# Patient Record
Sex: MALE | Race: WHITE | Employment: FULL TIME | URBAN - METROPOLITAN AREA
[De-identification: names, ages, dates, MRNs, and addresses within clinical notes are randomized per-mention and may not be internally consistent; named-entity substitution may affect disease eponyms.]

---

## 2017-08-23 ENCOUNTER — TELEPHONE (OUTPATIENT)
Dept: FAMILY MEDICINE CLINIC | Age: 47
End: 2017-08-23

## 2017-08-23 DIAGNOSIS — Z00.00 ANNUAL PHYSICAL EXAM: Primary | ICD-10-CM

## 2017-09-29 LAB
A/G RATIO: 1.7 (ref 1.1–2.2)
ALBUMIN SERPL-MCNC: 4.7 G/DL (ref 3.4–5)
ALP BLD-CCNC: 42 U/L (ref 40–129)
ALT SERPL-CCNC: 28 U/L (ref 10–40)
ANION GAP SERPL CALCULATED.3IONS-SCNC: 13 MMOL/L (ref 3–16)
AST SERPL-CCNC: 20 U/L (ref 15–37)
BASOPHILS ABSOLUTE: 0 K/UL (ref 0–0.2)
BASOPHILS RELATIVE PERCENT: 0.9 %
BILIRUB SERPL-MCNC: 1 MG/DL (ref 0–1)
BUN BLDV-MCNC: 19 MG/DL (ref 7–20)
CALCIUM SERPL-MCNC: 9.8 MG/DL (ref 8.3–10.6)
CHLORIDE BLD-SCNC: 100 MMOL/L (ref 99–110)
CHOLESTEROL, TOTAL: 159 MG/DL (ref 0–199)
CO2: 27 MMOL/L (ref 21–32)
CREAT SERPL-MCNC: 1 MG/DL (ref 0.9–1.3)
EOSINOPHILS ABSOLUTE: 0.2 K/UL (ref 0–0.6)
EOSINOPHILS RELATIVE PERCENT: 3.3 %
GFR AFRICAN AMERICAN: >60
GFR NON-AFRICAN AMERICAN: >60
GLOBULIN: 2.7 G/DL
GLUCOSE BLD-MCNC: 106 MG/DL (ref 70–99)
HCT VFR BLD CALC: 46.8 % (ref 40.5–52.5)
HDLC SERPL-MCNC: 50 MG/DL (ref 40–60)
HEMOGLOBIN: 15.7 G/DL (ref 13.5–17.5)
LDL CHOLESTEROL CALCULATED: 84 MG/DL
LYMPHOCYTES ABSOLUTE: 1.4 K/UL (ref 1–5.1)
LYMPHOCYTES RELATIVE PERCENT: 29.2 %
MCH RBC QN AUTO: 30.6 PG (ref 26–34)
MCHC RBC AUTO-ENTMCNC: 33.5 G/DL (ref 31–36)
MCV RBC AUTO: 91.4 FL (ref 80–100)
MONOCYTES ABSOLUTE: 0.4 K/UL (ref 0–1.3)
MONOCYTES RELATIVE PERCENT: 7.8 %
NEUTROPHILS ABSOLUTE: 2.9 K/UL (ref 1.7–7.7)
NEUTROPHILS RELATIVE PERCENT: 58.8 %
PDW BLD-RTO: 13.5 % (ref 12.4–15.4)
PLATELET # BLD: 195 K/UL (ref 135–450)
PMV BLD AUTO: 9 FL (ref 5–10.5)
POTASSIUM SERPL-SCNC: 4.8 MMOL/L (ref 3.5–5.1)
RBC # BLD: 5.12 M/UL (ref 4.2–5.9)
SODIUM BLD-SCNC: 140 MMOL/L (ref 136–145)
TOTAL PROTEIN: 7.4 G/DL (ref 6.4–8.2)
TRIGL SERPL-MCNC: 124 MG/DL (ref 0–150)
TSH REFLEX: 1.43 UIU/ML (ref 0.27–4.2)
VLDLC SERPL CALC-MCNC: 25 MG/DL
WBC # BLD: 5 K/UL (ref 4–11)

## 2017-10-05 ENCOUNTER — OFFICE VISIT (OUTPATIENT)
Dept: FAMILY MEDICINE CLINIC | Age: 47
End: 2017-10-05

## 2017-10-05 VITALS
DIASTOLIC BLOOD PRESSURE: 74 MMHG | OXYGEN SATURATION: 96 % | WEIGHT: 220 LBS | RESPIRATION RATE: 14 BRPM | BODY MASS INDEX: 31.5 KG/M2 | HEIGHT: 70 IN | HEART RATE: 61 BPM | SYSTOLIC BLOOD PRESSURE: 126 MMHG

## 2017-10-05 DIAGNOSIS — R73.9 HYPERGLYCEMIA: ICD-10-CM

## 2017-10-05 DIAGNOSIS — Z00.00 ANNUAL PHYSICAL EXAM: Primary | ICD-10-CM

## 2017-10-05 DIAGNOSIS — Z11.4 SCREENING FOR HIV (HUMAN IMMUNODEFICIENCY VIRUS): ICD-10-CM

## 2017-10-05 LAB
BILIRUBIN, POC: NORMAL
BLOOD URINE, POC: NORMAL
CLARITY, POC: CLEAR
COLOR, POC: YELLOW
GLUCOSE URINE, POC: NORMAL
KETONES, POC: NORMAL
LEUKOCYTE EST, POC: NORMAL
NITRITE, POC: NORMAL
PH, POC: 6
PROTEIN, POC: NORMAL
SPECIFIC GRAVITY, POC: 1.01
UROBILINOGEN, POC: 0.2

## 2017-10-05 PROCEDURE — 90630 INFLUENZA, QUADV, 18-64 YRS, ID, PF, MICRO INJ, 0.1ML (FLUZONE QUADV, PF): CPT | Performed by: FAMILY MEDICINE

## 2017-10-05 PROCEDURE — 81002 URINALYSIS NONAUTO W/O SCOPE: CPT | Performed by: FAMILY MEDICINE

## 2017-10-05 PROCEDURE — 90471 IMMUNIZATION ADMIN: CPT | Performed by: FAMILY MEDICINE

## 2017-10-05 PROCEDURE — 99396 PREV VISIT EST AGE 40-64: CPT | Performed by: FAMILY MEDICINE

## 2017-10-05 ASSESSMENT — PATIENT HEALTH QUESTIONNAIRE - PHQ9
SUM OF ALL RESPONSES TO PHQ QUESTIONS 1-9: 1
2. FEELING DOWN, DEPRESSED OR HOPELESS: 1
SUM OF ALL RESPONSES TO PHQ9 QUESTIONS 1 & 2: 1
1. LITTLE INTEREST OR PLEASURE IN DOING THINGS: 0

## 2017-10-05 NOTE — PROGRESS NOTES
Vaccine Information Sheet, \"Influenza - Inactivated\"  given to Maryuri Barbosa, or parent/legal guardian of  Maryuri Barbosa and verbalized understanding. Patient responses:    Have you ever had a reaction to a flu vaccine? No  Are you able to eat eggs without adverse effects? Yes  Do you have any current illness? No  Have you ever had Guillian [place] Syndrome? No    Flu vaccine given per order. Please see immunization tab.
scaling, itching, masses, hair or nail changes  Eyes: no change in vision  Ears/Nose/Throat: no hearing loss, tinnitus, vertigo, nosebleed, nasal congestion, rhinorrhea, sore throat  Respiratory: no cough or shortness of breath  Cardiovascular: no chest pain, SOLARES, orthopnea, PND, palpitations, or claudication  Gastrointestinal: no nausea, vomiting, abdominal pain or change in stools. No blood in stools. Genitourinary: no urinary symptoms or incontinence. No erectile dysfunction. Musculoskeletal: no arthritis, arthralgia, myalgia or weakness  Neurologic: no weakness/numbness, seizures, or headaches  Hematologic/Lymphatic/Immunologic: no abnormal bleeding/bruising, fever, chills, night sweats, swollen glands, or unexplained weight loss  Endocrine: no heat or cold intolerance and no polyphagia, polydipsia, or polyuria    OBJECTIVE:   /74 (Site: Left Arm, Position: Sitting, Cuff Size: Small Adult)  Pulse 61  Resp 14  Ht 5' 10\" (1.778 m)  Wt 220 lb (99.8 kg)  SpO2 96%  BMI 31.57 kg/m2  General appearance: healthy, alert, no distress, oriented x3  Skin: Skin color, texture, turgor normal. No rashes or suspicious lesions. No induration or tightening palpated. HEENT: normocephalic, atraumatic. PERRLA, EOMI. Conjunctiva/corneas clear. External ears normal. Canals clear. TM's clear bilaterally. Hearing was grossly normal.  Nares patent, septum midline, no drainage. Lips, mucosa and tongue normal.  Teeth and gums normal.  Oropharynx clear without exudate. Neck: supple. No adenopathy or thyromegaly. No carotid bruits were noted. Lungs: Lungs clear to auscultation bilaterally. No retractions or use of accessory muscles. No wheezes, rhonchi or rales. Heart: S1 and S2 normal, no murmurs, rubs or gallops, regular rate and rhythm. PMI nondisplaced. Abdomen: soft, non-tender, non-distended. Normoactive BS. No guarding, mass or organomegaly.     Extremities: No deformities, clubbing, cyanosis or edema,

## 2019-02-05 ENCOUNTER — OFFICE VISIT (OUTPATIENT)
Dept: FAMILY MEDICINE CLINIC | Age: 49
End: 2019-02-05
Payer: COMMERCIAL

## 2019-02-05 VITALS
SYSTOLIC BLOOD PRESSURE: 118 MMHG | HEART RATE: 68 BPM | BODY MASS INDEX: 31.04 KG/M2 | RESPIRATION RATE: 15 BRPM | DIASTOLIC BLOOD PRESSURE: 70 MMHG | HEIGHT: 70 IN | OXYGEN SATURATION: 98 % | WEIGHT: 216.8 LBS

## 2019-02-05 DIAGNOSIS — A08.4 VIRAL GASTROENTERITIS: Primary | ICD-10-CM

## 2019-02-05 PROCEDURE — 99213 OFFICE O/P EST LOW 20 MIN: CPT | Performed by: FAMILY MEDICINE

## 2019-02-05 ASSESSMENT — PATIENT HEALTH QUESTIONNAIRE - PHQ9
2. FEELING DOWN, DEPRESSED OR HOPELESS: 0
SUM OF ALL RESPONSES TO PHQ9 QUESTIONS 1 & 2: 0
1. LITTLE INTEREST OR PLEASURE IN DOING THINGS: 0
SUM OF ALL RESPONSES TO PHQ QUESTIONS 1-9: 0
SUM OF ALL RESPONSES TO PHQ QUESTIONS 1-9: 0

## 2019-02-18 ENCOUNTER — OFFICE VISIT (OUTPATIENT)
Dept: FAMILY MEDICINE CLINIC | Age: 49
End: 2019-02-18
Payer: COMMERCIAL

## 2019-02-18 VITALS
WEIGHT: 214.9 LBS | DIASTOLIC BLOOD PRESSURE: 60 MMHG | TEMPERATURE: 99.4 F | HEART RATE: 74 BPM | OXYGEN SATURATION: 98 % | BODY MASS INDEX: 30.83 KG/M2 | SYSTOLIC BLOOD PRESSURE: 90 MMHG

## 2019-02-18 DIAGNOSIS — J10.1 INFLUENZA A: Primary | ICD-10-CM

## 2019-02-18 LAB
INFLUENZA A ANTIBODY: NORMAL
INFLUENZA B ANTIBODY: NORMAL

## 2019-02-18 PROCEDURE — 87804 INFLUENZA ASSAY W/OPTIC: CPT | Performed by: FAMILY MEDICINE

## 2019-02-18 PROCEDURE — 99213 OFFICE O/P EST LOW 20 MIN: CPT | Performed by: FAMILY MEDICINE

## 2019-02-18 RX ORDER — OSELTAMIVIR PHOSPHATE 75 MG/1
75 CAPSULE ORAL 2 TIMES DAILY
Qty: 10 CAPSULE | Refills: 0 | Status: SHIPPED | OUTPATIENT
Start: 2019-02-18 | End: 2019-02-23

## 2020-11-30 ENCOUNTER — VIRTUAL VISIT (OUTPATIENT)
Dept: FAMILY MEDICINE CLINIC | Age: 50
End: 2020-11-30
Payer: COMMERCIAL

## 2020-11-30 ENCOUNTER — NURSE TRIAGE (OUTPATIENT)
Dept: OTHER | Facility: CLINIC | Age: 50
End: 2020-11-30

## 2020-11-30 ENCOUNTER — OFFICE VISIT (OUTPATIENT)
Dept: PRIMARY CARE CLINIC | Age: 50
End: 2020-11-30
Payer: COMMERCIAL

## 2020-11-30 PROCEDURE — 99211 OFF/OP EST MAY X REQ PHY/QHP: CPT | Performed by: NURSE PRACTITIONER

## 2020-11-30 PROCEDURE — 99213 OFFICE O/P EST LOW 20 MIN: CPT | Performed by: FAMILY MEDICINE

## 2020-11-30 ASSESSMENT — PATIENT HEALTH QUESTIONNAIRE - PHQ9
SUM OF ALL RESPONSES TO PHQ9 QUESTIONS 1 & 2: 0
SUM OF ALL RESPONSES TO PHQ QUESTIONS 1-9: 0
1. LITTLE INTEREST OR PLEASURE IN DOING THINGS: 0
SUM OF ALL RESPONSES TO PHQ QUESTIONS 1-9: 0
2. FEELING DOWN, DEPRESSED OR HOPELESS: 0
SUM OF ALL RESPONSES TO PHQ QUESTIONS 1-9: 0

## 2020-11-30 NOTE — TELEPHONE ENCOUNTER
Chills, fever (99.9) this morning, muscle pain, headache, fatigue, cough, upper body soreness and runny nose after eating, chest congestion, low back pain. Symptoms started Sunday morning. Tylenol taken yesterday. Reason for Disposition   Patient wants to be seen    Answer Assessment - Initial Assessment Questions  1. ONSET: \"When did the pain begin? \"       Yesterday morning    2. LOCATION: \"Where does it hurt? \" (upper, mid or lower back)      Lower back    3. SEVERITY: \"How bad is the pain? \"  (e.g., Scale 1-10; mild, moderate, or severe)    - MILD (1-3): doesn't interfere with normal activities     - MODERATE (4-7): interferes with normal activities or awakens from sleep     - SEVERE (8-10): excruciating pain, unable to do any normal activities       4/10    4. PATTERN: \"Is the pain constant? \" (e.g., yes, no; constant, intermittent)       Constant    5. RADIATION: \"Does the pain shoot into your legs or elsewhere? \"      Denies    6. CAUSE:  \"What do you think is causing the back pain? \"       Not sure    7. BACK OVERUSE:  Aundrea [place] recent lifting of heavy objects, strenuous work or exercise? \"      Denies    8. MEDICATIONS: \"What have you taken so far for the pain? \" (e.g., nothing, acetaminophen, NSAIDS)      Tylenol    9. NEUROLOGIC SYMPTOMS: \"Do you have any weakness, numbness, or problems with bowel/bladder control? \"      Denies    10. OTHER SYMPTOMS: \"Do you have any other symptoms? \" (e.g., fever, abdominal pain, burning with urination, blood in urine)        See above    11. PREGNANCY: \"Is there any chance you are pregnant? \" (e.g., yes, no; LMP)        Na    Protocols used: BACK PAIN-ADULT-[place]    Patient called pre-service center [facility]) to schedule appointment, with red flag complaint, transferred to RN access for triage. See above questions and answers. Caller talking full sentences without any distress on phone. Discussed disposition and patient agreeable.   Discussed potential consequences for not following disposition recommendation. Aware to call back with any concerns or persistent, worsening, or new symptoms develop. Warm transfer to [facility] scheduling for appointment. Attention Provider: Thank you for allowing me to participate in the care of your patient. The  patient was connected to triage in response to information provided to the ECC. Please do not respond through this encounter as the response is not directed to a shared pool.

## 2020-11-30 NOTE — PROGRESS NOTES
Tony Bell received a viral test for COVID-19. They were educated on isolation and quarantine as appropriate. For any symptoms, they were directed to seek care from their PCP, given contact information to establish with a doctor, directed to an urgent care or the emergency room.

## 2020-11-30 NOTE — PROGRESS NOTES
2020    TELEHEALTH EVALUATION -- Audio/Visual (During PNYPQ-83 public health emergency)    HPI:    Rehan Fam (:  1970) has requested an audio/video evaluation for the following concern(s):    C/o 1-2 day history of back pain, chest congestion, muscle aches, chills, temp up to 99.9F. Was near his mother in law who had URI symptoms and fever but had not been tested for covid19. No GI upset. No loss of taste of smell. + headaches. Review of Systems:  Gen:   No weight loss  HEENT:  Denies sore throat. CV:  Denies chest pain or tightness, palpitations. Pulm:  Denies shortness of breath  Abd:  Denies abdominal pain, change in bowel habits. Prior to Visit Medications    Medication Sig Taking? Authorizing Provider   Probiotic Product (PROBIOTIC-10 PO) Take by mouth Yes Historical Provider, MD   Multiple Vitamins-Minerals (THERAPEUTIC MULTIVITAMIN-MINERALS) tablet Take 1 tablet by mouth daily. Yes Historical Provider, MD       Past Medical History:   Diagnosis Date    Asthma     Mitral regurgitation     PUD (peptic ulcer disease)        Past Surgical History:   Procedure Laterality Date    CARDIAC CATHETERIZATION  2003    normal    COLONOSCOPY  2006    normal    KNEE SURGERY Left 2016    UPPER GASTROINTESTINAL ENDOSCOPY  age 16       Family History   Problem Relation Age of Onset    Alcohol Abuse Mother     Diabetes Mother     Heart Failure Mother 52    High Cholesterol Father     Diabetes Maternal Grandmother     Cancer Paternal Grandfather 79        unknown       No Known Allergies    Social History     Tobacco Use    Smoking status: Never Smoker    Smokeless tobacco: Never Used   Substance Use Topics    Alcohol use: No    Drug use: No          PHYSICAL EXAMINATION:  Vital Signs: (As obtained by patient/caregiver or practitioner observation)  There were no vitals taken for this visit.   Patient-Reported Vitals 2020   Patient-Reported Weight 217lb Patient-Reported Temperature 99.9        Respiratory rate appears normal      Constitutional: Appears well-developed and well-nourished. No apparent distress    Mental status: Alert and awake. Oriented to person/place/time. Able to follow commands    Eyes: EOM normal. Sclera normal. No discharge visible  HENT: Normocephalic, atraumatic. Mouth/Throat: Mucous membranes are moist. External Ears Normal    Neck: No visualized mass   Pulmonary/Chest: Respiratory effort normal.  No visualized signs of difficulty breathing or respiratory distress        Musculoskeletal:  Normal range of motion of neck  Neurological:       No Facial Asymmetry (Cranial nerve 7 motor function) (limited exam to video visit). No gaze palsy       Skin:  No significant exanthematous lesions or discoloration noted on facial skin       Psychiatric: Normal Affect. No Hallucinations            ASSESSMENT/PLAN:  1. Suspected COVID-19 virus infection  Supportive care reviewed  Quarantine guidelines discussed  Will set up for testing      No follow-ups on file. Rehan Fam is a 53 y.o. male being evaluated by a Virtual Visit (video visit) encounter to address concerns as mentioned above. A caregiver was present when appropriate. Due to this being a TeleHealth encounter ([facility]), evaluation of the following organ systems was limited: Vitals/Constitutional/EENT/Resp/CV/GI//MS/Neuro/Skin/Heme-Lymph-Imm. Pursuant to the emergency declaration under the [facility], [address] [place] [address] authority and the [employer] and [employer] Act, this Virtual Visit was conducted with patient's (and/or legal guardian's) consent, to reduce the patient's risk of exposure to COVID-19 and provide necessary medical care.   The patient (and/or legal guardian) has also been advised to contact this office for worsening conditions or problems, and seek emergency medical treatment and/or call 911 if deemed necessary. Patient identification was verified at the start of the visit: Yes    Total time spent on this encounter: 8 minutes. Services were provided through a video synchronous discussion virtually to substitute for in-person clinic visit. Patient was located in their home. Provider was located in the office. --Alexandr Pizarro MD on 11/30/2020 at 12:51 PM    An electronic signature was used to authenticate this note. Pia Cantrlel

## 2020-11-30 NOTE — PATIENT INSTRUCTIONS

## 2020-12-02 LAB — SARS-COV-2, NAA: DETECTED

## 2021-12-06 ENCOUNTER — OFFICE VISIT (OUTPATIENT)
Dept: FAMILY MEDICINE CLINIC | Age: 51
End: 2021-12-06
Payer: COMMERCIAL

## 2021-12-06 VITALS
HEIGHT: 70 IN | HEART RATE: 63 BPM | BODY MASS INDEX: 24.77 KG/M2 | DIASTOLIC BLOOD PRESSURE: 74 MMHG | OXYGEN SATURATION: 98 % | SYSTOLIC BLOOD PRESSURE: 122 MMHG | WEIGHT: 173 LBS

## 2021-12-06 DIAGNOSIS — Z00.00 ANNUAL PHYSICAL EXAM: Primary | ICD-10-CM

## 2021-12-06 DIAGNOSIS — M79.662 PAIN OF LEFT CALF: ICD-10-CM

## 2021-12-06 DIAGNOSIS — M67.432 GANGLION CYST OF VOLAR ASPECT OF LEFT WRIST: ICD-10-CM

## 2021-12-06 PROCEDURE — 3017F COLORECTAL CA SCREEN DOC REV: CPT | Performed by: FAMILY MEDICINE

## 2021-12-06 PROCEDURE — G8427 DOCREV CUR MEDS BY ELIG CLIN: HCPCS | Performed by: FAMILY MEDICINE

## 2021-12-06 PROCEDURE — 99396 PREV VISIT EST AGE 40-64: CPT | Performed by: FAMILY MEDICINE

## 2021-12-06 PROCEDURE — G8420 CALC BMI NORM PARAMETERS: HCPCS | Performed by: FAMILY MEDICINE

## 2021-12-06 PROCEDURE — 90750 HZV VACC RECOMBINANT IM: CPT | Performed by: FAMILY MEDICINE

## 2021-12-06 PROCEDURE — 4004F PT TOBACCO SCREEN RCVD TLK: CPT | Performed by: FAMILY MEDICINE

## 2021-12-06 PROCEDURE — 99213 OFFICE O/P EST LOW 20 MIN: CPT | Performed by: FAMILY MEDICINE

## 2021-12-06 PROCEDURE — G8482 FLU IMMUNIZE ORDER/ADMIN: HCPCS | Performed by: FAMILY MEDICINE

## 2021-12-06 PROCEDURE — 90471 IMMUNIZATION ADMIN: CPT | Performed by: FAMILY MEDICINE

## 2021-12-06 ASSESSMENT — PATIENT HEALTH QUESTIONNAIRE - PHQ9
2. FEELING DOWN, DEPRESSED OR HOPELESS: 0
1. LITTLE INTEREST OR PLEASURE IN DOING THINGS: 0
SUM OF ALL RESPONSES TO PHQ QUESTIONS 1-9: 0
SUM OF ALL RESPONSES TO PHQ9 QUESTIONS 1 & 2: 0
SUM OF ALL RESPONSES TO PHQ QUESTIONS 1-9: 0
SUM OF ALL RESPONSES TO PHQ QUESTIONS 1-9: 0

## 2021-12-06 NOTE — PROGRESS NOTES
SUBJECTIVE:   Diego Mckay is a 49 y.o. male presenting for his annual checkup. HPI: c/o cyst on left wrist for many years. Recently has gotten bigger. Not painful or bothersome. C/o left calf cramping at night intermittently. Always on left leg. Helps to stretch out his calf on a step. Sometimes feels like water is running down his calf when he does that. Patient Active Problem List   Diagnosis    Mitral regurgitation    Asthma       Past Medical History:   Diagnosis Date    Asthma     Mitral regurgitation 2007    PUD (peptic ulcer disease) 1983       Past Surgical History:   Procedure Laterality Date    CARDIAC CATHETERIZATION  2003    normal    COLONOSCOPY  2006    normal    KNEE SURGERY Left 2016    UPPER GASTROINTESTINAL ENDOSCOPY  age 16       Social History     Socioeconomic History    Marital status:      Spouse name: Not on file    Number of children: 0    Years of education: Not on file    Highest education level: Not on file   Occupational History    Occupation:    Tobacco Use    Smoking status: Never Smoker    Smokeless tobacco: Never Used   Substance and Sexual Activity    Alcohol use: No    Drug use: No    Sexual activity: Yes     Partners: Female   Other Topics Concern    Not on file   Social History Narrative    Not on file     Social Determinants of Health     Financial Resource Strain:     Difficulty of Paying Living Expenses: Not on file   Food Insecurity:     Worried About Running Out of Food in the Last Year: Not on file    Angelito of Food in the Last Year: Not on file   Transportation Needs:     Lack of Transportation (Medical): Not on file    Lack of Transportation (Non-Medical):  Not on file   Physical Activity:     Days of Exercise per Week: Not on file    Minutes of Exercise per Session: Not on file   Stress:     Feeling of Stress : Not on file   Social Connections:     Frequency of Communication with Friends and Family: Not on file    Frequency of Social Gatherings with Friends and Family: Not on file    Attends Zoroastrian Services: Not on file    Active Member of Clubs or Organizations: Not on file    Attends Club or Organization Meetings: Not on file    Marital Status: Not on file   Intimate Partner Violence:     Fear of Current or Ex-Partner: Not on file    Emotionally Abused: Not on file    Physically Abused: Not on file    Sexually Abused: Not on file   Housing Stability:     Unable to Pay for Housing in the Last Year: Not on file    Number of [place] in the Last Year: Not on file    Unstable Housing in the Last Year: Not on file       Family History   Problem Relation Age of Onset    Alcohol Abuse Mother     Diabetes Mother     Heart Failure Mother 52    High Cholesterol Father     Diabetes Maternal Grandmother     Cancer Paternal Grandfather 79        unknown       Current Outpatient Medications   Medication Sig Dispense Refill    Probiotic Product (PROBIOTIC-10 PO) Take by mouth      Multiple Vitamins-Minerals (THERAPEUTIC MULTIVITAMIN-MINERALS) tablet Take 1 tablet by mouth daily. No current facility-administered medications for this visit. Allergies: Patient has no known allergies.      Health Maintenance   Topic Date Due    Hepatitis C screen  Never done    Pneumococcal 0-64 years Vaccine (1 of 2 - PPSV23) Never done    HIV screen  Never done    Colon cancer screen colonoscopy  12/18/2016    Shingles Vaccine (1 of 2) Never done    COVID-19 Vaccine (3 - Booster for Pfizer series) 10/14/2021    Lipid screen  09/29/2022    DTaP/Tdap/Td vaccine (2 - Td or Tdap) 04/14/2024    Flu vaccine  Completed    Hepatitis A vaccine  Aged Out    Hepatitis B vaccine  Aged Out    Hib vaccine  Aged Out    Meningococcal (ACWY) vaccine  Aged Out       ROS:    Skin: no changing moles, abnormal pigmentation, rash, scaling, itching, masses, hair or nail changes  Eyes: no change in vision  Ears/Nose/Throat: no hearing loss, tinnitus, vertigo, nosebleed, nasal congestion, rhinorrhea, sore throat  Respiratory: no cough or shortness of breath  Cardiovascular: no chest pain, SOLARES, orthopnea, PND, palpitations, or claudication  Gastrointestinal: no nausea, vomiting, abdominal pain or change in stools. No blood in stools. Genitourinary: no urinary symptoms or incontinence. No erectile dysfunction. Musculoskeletal: no arthritis, arthralgia or weakness  Neurologic: no weakness/numbness, seizures, or headaches  Hematologic/Lymphatic/Immunologic: no abnormal bleeding/bruising, fever, chills, night sweats, swollen glands, or unexplained weight loss  Endocrine: no heat or cold intolerance and no polyphagia, polydipsia, or polyuria    OBJECTIVE:   /74 (Site: Right Upper Arm, Position: Sitting, Cuff Size: Medium Adult)   Pulse 63   Ht 5' 10\" (1.778 m)   Wt 173 lb (78.5 kg)   SpO2 98%   BMI 24.82 kg/m²   General appearance: healthy, alert, no distress  Skin: Skin color, texture, turgor normal. No rashes or suspicious lesions. No induration or tightening palpated. Head: Normocephalic. No masses, lesions, tenderness or abnormalities  Eyes: Conjunctivae/corneas clear. PERRL, EOM's intact. Ears: External ears normal. Canals clear. TM's clear bilaterally. Hearing grossly normal.  Nose/Sinuses: Nares normal.   Oropharynx: Lips, mucosa, and tongue normal. Teeth and gums normal. Oropharynx clear with no exudate seen. Neck: Neck supple, and symmetric. No adenopathy. Thyroid symmetric, normal size, without nodule. Trachea is midline. Back: Back symmetric, no curvature. ROM normal. No CVA tenderness. Lungs: Good diaphragmatic excursion. Lungs clear to auscultation bilaterally. No retractions or use of accessory muscles. Heart: PMI is not displaced, and no thrill noted. Regular rate and rhythm, with no rub, murmur or gallop noted. 2+ PT pulses bilaterally. Abdomen: Abdomen soft, non-tender.  BS Postop Diagnosis: same

## 2021-12-10 DIAGNOSIS — Z00.00 ANNUAL PHYSICAL EXAM: ICD-10-CM

## 2021-12-10 LAB
A/G RATIO: 2.2 (ref 1.1–2.2)
ALBUMIN SERPL-MCNC: 4.6 G/DL (ref 3.4–5)
ALP BLD-CCNC: 44 U/L (ref 40–129)
ALT SERPL-CCNC: 23 U/L (ref 10–40)
ANION GAP SERPL CALCULATED.3IONS-SCNC: 11 MMOL/L (ref 3–16)
AST SERPL-CCNC: 19 U/L (ref 15–37)
BASOPHILS ABSOLUTE: 0 K/UL (ref 0–0.2)
BASOPHILS RELATIVE PERCENT: 0.6 %
BILIRUB SERPL-MCNC: 0.7 MG/DL (ref 0–1)
BUN BLDV-MCNC: 21 MG/DL (ref 7–20)
CALCIUM SERPL-MCNC: 9.5 MG/DL (ref 8.3–10.6)
CHLORIDE BLD-SCNC: 103 MMOL/L (ref 99–110)
CHOLESTEROL, TOTAL: 149 MG/DL (ref 0–199)
CO2: 24 MMOL/L (ref 21–32)
CREAT SERPL-MCNC: 0.8 MG/DL (ref 0.9–1.3)
EOSINOPHILS ABSOLUTE: 0.1 K/UL (ref 0–0.6)
EOSINOPHILS RELATIVE PERCENT: 3.5 %
GFR AFRICAN AMERICAN: >60
GFR NON-AFRICAN AMERICAN: >60
GLUCOSE BLD-MCNC: 107 MG/DL (ref 70–99)
HCT VFR BLD CALC: 42.9 % (ref 40.5–52.5)
HDLC SERPL-MCNC: 52 MG/DL (ref 40–60)
HEMOGLOBIN: 14.2 G/DL (ref 13.5–17.5)
LDL CHOLESTEROL CALCULATED: 81 MG/DL
LYMPHOCYTES ABSOLUTE: 1.1 K/UL (ref 1–5.1)
LYMPHOCYTES RELATIVE PERCENT: 33.1 %
MCH RBC QN AUTO: 30.1 PG (ref 26–34)
MCHC RBC AUTO-ENTMCNC: 33.2 G/DL (ref 31–36)
MCV RBC AUTO: 90.8 FL (ref 80–100)
MONOCYTES ABSOLUTE: 0.3 K/UL (ref 0–1.3)
MONOCYTES RELATIVE PERCENT: 9.1 %
NEUTROPHILS ABSOLUTE: 1.7 K/UL (ref 1.7–7.7)
NEUTROPHILS RELATIVE PERCENT: 53.7 %
PDW BLD-RTO: 13.2 % (ref 12.4–15.4)
PLATELET # BLD: 184 K/UL (ref 135–450)
PMV BLD AUTO: 8.4 FL (ref 5–10.5)
POTASSIUM SERPL-SCNC: 4.6 MMOL/L (ref 3.5–5.1)
PROSTATE SPECIFIC ANTIGEN: 1.79 NG/ML (ref 0–4)
RBC # BLD: 4.72 M/UL (ref 4.2–5.9)
SODIUM BLD-SCNC: 138 MMOL/L (ref 136–145)
TOTAL PROTEIN: 6.7 G/DL (ref 6.4–8.2)
TRIGL SERPL-MCNC: 82 MG/DL (ref 0–150)
TSH REFLEX: 1.22 UIU/ML (ref 0.27–4.2)
VLDLC SERPL CALC-MCNC: 16 MG/DL
WBC # BLD: 3.2 K/UL (ref 4–11)

## 2021-12-22 ENCOUNTER — OFFICE VISIT (OUTPATIENT)
Dept: FAMILY MEDICINE CLINIC | Age: 51
End: 2021-12-22
Payer: COMMERCIAL

## 2021-12-22 VITALS
WEIGHT: 213 LBS | OXYGEN SATURATION: 96 % | HEIGHT: 70 IN | BODY MASS INDEX: 30.49 KG/M2 | SYSTOLIC BLOOD PRESSURE: 122 MMHG | DIASTOLIC BLOOD PRESSURE: 80 MMHG | HEART RATE: 72 BPM

## 2021-12-22 DIAGNOSIS — M79.609 POPLITEAL PAIN: Primary | ICD-10-CM

## 2021-12-22 PROCEDURE — G8482 FLU IMMUNIZE ORDER/ADMIN: HCPCS | Performed by: FAMILY MEDICINE

## 2021-12-22 PROCEDURE — G8427 DOCREV CUR MEDS BY ELIG CLIN: HCPCS | Performed by: FAMILY MEDICINE

## 2021-12-22 PROCEDURE — G8417 CALC BMI ABV UP PARAM F/U: HCPCS | Performed by: FAMILY MEDICINE

## 2021-12-22 PROCEDURE — 4004F PT TOBACCO SCREEN RCVD TLK: CPT | Performed by: FAMILY MEDICINE

## 2021-12-22 PROCEDURE — 3017F COLORECTAL CA SCREEN DOC REV: CPT | Performed by: FAMILY MEDICINE

## 2021-12-22 PROCEDURE — 99213 OFFICE O/P EST LOW 20 MIN: CPT | Performed by: FAMILY MEDICINE

## 2021-12-22 SDOH — ECONOMIC STABILITY: FOOD INSECURITY: WITHIN THE PAST 12 MONTHS, THE FOOD YOU BOUGHT JUST DIDN'T LAST AND YOU DIDN'T HAVE MONEY TO GET MORE.: NEVER TRUE

## 2021-12-22 SDOH — ECONOMIC STABILITY: FOOD INSECURITY: WITHIN THE PAST 12 MONTHS, YOU WORRIED THAT YOUR FOOD WOULD RUN OUT BEFORE YOU GOT MONEY TO BUY MORE.: NEVER TRUE

## 2021-12-22 ASSESSMENT — SOCIAL DETERMINANTS OF HEALTH (SDOH): HOW HARD IS IT FOR YOU TO PAY FOR THE VERY BASICS LIKE FOOD, HOUSING, MEDICAL CARE, AND HEATING?: NOT HARD AT ALL

## 2021-12-22 NOTE — PROGRESS NOTES
Abhishek Smith is a 49 y.o. male. HPI:  Was seen 2 weeks ago for left upper calf cramping at night intermittently. Always on left leg.  occurs about an hour into sleeping. Now hurting during the day as well. Was helping to stretch out his calf on a step but not anymore. Sometimes feels like water is running down his calf when he does that. Labs were unremarkable. Pain not present today. ROS:  Gen:  Denies fever, chills, headaches. HEENT:  Denies cold symptoms, sore throat. CV:  Denies chest pain or tightness, palpitations. Pulm:  Denies shortness of breath, cough. Abd:  Denies abdominal pain, change in bowel habits. I have reviewed the patient's medical/surgical/family/social in detail and updated the computerized patient record as appropriate. Current Outpatient Medications   Medication Sig Dispense Refill    Probiotic Product (PROBIOTIC-10 PO) Take by mouth      Multiple Vitamins-Minerals (THERAPEUTIC MULTIVITAMIN-MINERALS) tablet Take 1 tablet by mouth daily. No current facility-administered medications for this visit.        Past Medical History:   Diagnosis Date    Asthma     Mitral regurgitation 2007    PUD (peptic ulcer disease) 1983     Past Surgical History:   Procedure Laterality Date    CARDIAC CATHETERIZATION  2003    normal    COLONOSCOPY  2006    normal    KNEE SURGERY Left 2016    UPPER GASTROINTESTINAL ENDOSCOPY  age 16     Family History   Problem Relation Age of Onset    Alcohol Abuse Mother     Diabetes Mother     Heart Failure Mother 52    High Cholesterol Father     Diabetes Maternal Grandmother     Cancer Paternal Grandfather 79        unknown     Social History     Socioeconomic History    Marital status:      Spouse name: Not on file    Number of children: 0    Years of education: Not on file    Highest education level: Not on file   Occupational History    Occupation:    Tobacco Use    Smoking status: Never Smoker    Smokeless tobacco: Never Used   Substance and Sexual Activity    Alcohol use: No    Drug use: No    Sexual activity: Yes     Partners: Female   Other Topics Concern    Not on file   Social History Narrative    Not on file     Social Determinants of Health     Financial Resource Strain: Low Risk     Difficulty of Paying Living Expenses: Not hard at all   Food Insecurity: No Food Insecurity    Worried About Running Out of Food in the Last Year: Never true    [address] in the Last Year: Never true   Transportation Needs:     Lack of Transportation (Medical): Not on file    Lack of Transportation (Non-Medical): Not on file   Physical Activity:     Days of Exercise per Week: Not on file    Minutes of Exercise per Session: Not on file   Stress:     Feeling of Stress : Not on file   Social Connections:     Frequency of Communication with Friends and Family: Not on file    Frequency of Social Gatherings with Friends and Family: Not on file    Attends Oriental orthodox Services: Not on file    Active Member of [address] [place] [address] or Organizations: Not on file    Attends Club or Organization Meetings: Not on file    Marital Status: Not on file   Intimate Partner Violence:     Fear of Current or Ex-Partner: Not on file    Emotionally Abused: Not on file    Physically Abused: Not on file    Sexually Abused: Not on file   Housing Stability:     Unable to Pay for Housing in the Last Year: Not on file    Number of [place] in the Last Year: Not on file    Unstable Housing in the Last Year: Not on file         OBJECTIVE:  /80 (Site: Right Upper Arm, Position: Sitting, Cuff Size: Medium Adult)   Pulse 72   Ht 5' 10\" (1.778 m)   Wt 213 lb (96.6 kg)   SpO2 96%   BMI 30.56 kg/m²   GEN:  WDWN, NAD  HEENT:  NCAT, PERRL, EOMI. EXT: no calf tenderness. Minimally ttp over left popliteal fossa. Minimal swelling noted as well  PSYCH: normal mood and affect.  Intact judgement and insight  NEURO: A&O x 3    ASSESSMENT/PLAN:  1. Popliteal pain  Will get US to evaluation for baker cyst  To ortho pending results  - US SOFT TISSUE LIMITED AREA;  Future

## 2022-01-07 ENCOUNTER — HOSPITAL ENCOUNTER (OUTPATIENT)
Dept: ULTRASOUND IMAGING | Age: 52
Discharge: HOME | End: 2022-01-07
Payer: COMMERCIAL

## 2022-01-07 DIAGNOSIS — M79.609 POPLITEAL PAIN: ICD-10-CM

## 2022-01-07 PROCEDURE — 76999 ECHO EXAMINATION PROCEDURE: CPT

## 2022-01-12 ENCOUNTER — OFFICE VISIT (OUTPATIENT)
Dept: ORTHOPEDIC SURGERY | Age: 52
End: 2022-01-12
Payer: COMMERCIAL

## 2022-01-12 VITALS — HEIGHT: 70 IN | WEIGHT: 213 LBS | BODY MASS INDEX: 30.49 KG/M2

## 2022-01-12 DIAGNOSIS — M25.562 LEFT KNEE PAIN, UNSPECIFIED CHRONICITY: ICD-10-CM

## 2022-01-12 DIAGNOSIS — M71.22 SYNOVIAL CYST OF POPLITEAL SPACE, LEFT: ICD-10-CM

## 2022-01-12 DIAGNOSIS — M17.12 PRIMARY OSTEOARTHRITIS OF LEFT KNEE: Primary | ICD-10-CM

## 2022-01-12 PROCEDURE — G8428 CUR MEDS NOT DOCUMENT: HCPCS | Performed by: INTERNAL MEDICINE

## 2022-01-12 PROCEDURE — 99203 OFFICE O/P NEW LOW 30 MIN: CPT | Performed by: INTERNAL MEDICINE

## 2022-01-12 PROCEDURE — G8417 CALC BMI ABV UP PARAM F/U: HCPCS | Performed by: INTERNAL MEDICINE

## 2022-01-12 PROCEDURE — G8482 FLU IMMUNIZE ORDER/ADMIN: HCPCS | Performed by: INTERNAL MEDICINE

## 2022-01-12 RX ORDER — KETOROLAC TROMETHAMINE 10 MG/1
10 TABLET, FILM COATED ORAL 3 TIMES DAILY
Qty: 15 TABLET | Refills: 0 | Status: SHIPPED | OUTPATIENT
Start: 2022-01-12 | End: 2022-03-01 | Stop reason: ALTCHOICE

## 2022-01-12 NOTE — LETTER
[address]  [address] [facility] [address]  Phone: [phone]  Fax: [phone]    Nella Sicard, MD    January 12, 2022     MD [facility] [address] [place] [address]    Patient: Russell Roth   MR Number: [id number]   YOB: 1970   Date of Visit: 1/12/2022       Dear Alla Contreras:    Thank you for referring Rasheed Tai to me for evaluation/treatment. Below are the relevant portions of my assessment and plan of care. Impression: . Encounter Diagnoses   Name Primary?  Synovial cyst of popliteal space, left     Primary osteoarthritis of left knee Yes    Left knee pain, unspecified chronicity               Plan:       High-dose NSAIDs-Toradol along with elastic compression  Consider ultrasound-guided aspiration and injection with Celestone on follow-up if symptoms show no appreciable change  Consider biologic orthopedic injection as an alternative if this is refractory to aggressive conservative management      The nature of the finding, probable diagnosis and likely treatment was thoroughly discussed with the patient. The options, risks, complications, alternative treatment as well as some of the differential diagnosis was discussed. The patient was thoroughly informed and all questions were answered. the patient indicated understanding and satisfaction with the discussion. Orders:        Orders Placed This Encounter   Procedures    XR KNEE LEFT (MIN 4 VIEWS)     Standing Status:   Future     Number of Occurrences:   1     Standing Expiration Date:   1/12/2023     Order Specific Question:   Reason for exam:     Answer:   pain    XR KNEE RIGHT (3 VIEWS)     Standing Status:   Future     Number of Occurrences:   1     Standing Expiration Date:   1/12/2023     Order Specific Question:   Reason for exam:     Answer:   pain           Disclaimer:     \"This note was dictated with voice recognition software. Though review and correction are routine, we apologize for any errors. \"           If you have questions, please do not hesitate to call me. I look forward to following Skyler Cheema along with you.     Sincerely,      Karey Winston MD

## 2022-01-12 NOTE — PROGRESS NOTES
Chief Complaint:   Chief Complaint   Patient presents with    Leg Pain     Lt knee bakers cyst. Pain is 6/10 , wakes him up and keeps him from sleeping. Pain x 2 yrs          History of Present Illness:       Patient is a 49 y.o. male presents with the above complaint. He is seen in consultation at request of Dr. Thomas Sam working diagnosis of popliteal synovial cyst.  The symptoms began 5 plus plus yearsago and started without an injury. The patient describes a aching, tightness pain that does not radiate. The symptoms are intermittent  and are show no change since the onset. Past orthopedic history significant for knee arthroscopy 2016-left    Work-up has included diagnostic ultrasound which is outlined below in detail. Pain localizes to the back of the knee and  does not seems to follow a typical patella femoral provacative pattern. There are not mechanical symptoms that suggest meniscal injury. The patient denies subjective instability about the knee and denies new onset weakness of the lower extremity. He reports improvement of symptoms with calf stretching    Pain level 6    The patient denies a pattern of activity related swelling. Treatment to date: none. There is no recent history of knee trauma. There is no prior history of autoimmune disease, inflammatory arthropathy, or crystal arthropathy.              Past Medical History:        Past Medical History:   Diagnosis Date    Asthma     Mitral regurgitation 2007    PUD (peptic ulcer disease) 1983         Past Surgical History:   Procedure Laterality Date    CARDIAC CATHETERIZATION  2003    normal    COLONOSCOPY  2006    normal    KNEE SURGERY Left 2016    UPPER GASTROINTESTINAL ENDOSCOPY  age 16         Present Medications:         Current Outpatient Medications   Medication Sig Dispense Refill    ketorolac (TORADOL) 10 MG tablet Take 1 tablet by mouth three times daily Discontinue all other NSAIDs during the course of treatment and resumed thereafter 15 tablet 0    Probiotic Product (PROBIOTIC-10 PO) Take by mouth      Multiple Vitamins-Minerals (THERAPEUTIC MULTIVITAMIN-MINERALS) tablet Take 1 tablet by mouth daily. No current facility-administered medications for this visit. Allergies:      No Known Allergies     Social History:         Social History     Socioeconomic History    Marital status:      Spouse name: Not on file    Number of children: 0    Years of education: Not on file    Highest education level: Not on file   Occupational History    Occupation:    Tobacco Use    Smoking status: Never Smoker    Smokeless tobacco: Never Used   Substance and Sexual Activity    Alcohol use: No    Drug use: No    Sexual activity: Yes     Partners: Female   Other Topics Concern    Not on file   Social History Narrative    Not on file     Social Determinants of Health     Financial Resource Strain: Low Risk     Difficulty of Paying Living Expenses: Not hard at all   Food Insecurity: No Food Insecurity    Worried About [address] in the Last Year: Never true    [address] in the Last Year: Never true   Transportation Needs:     Lack of Transportation (Medical): Not on file    Lack of Transportation (Non-Medical):  Not on file   Physical Activity:     Days of Exercise per Week: Not on file    Minutes of Exercise per Session: Not on file   Stress:     Feeling of Stress : Not on file   Social Connections:     Frequency of Communication with Friends and Family: Not on file    Frequency of Social Gatherings with Friends and Family: Not on file    Attends Yarsani Services: Not on file    Active Member of Clubs or Organizations: Not on file    Attends Club or Organization Meetings: Not on file    Marital Status: Not on file   Intimate Partner Violence:     Fear of Current or Ex-Partner: Not on file    Emotionally Abused: Not on file    Physically Abused: Not on file    Sexually Abused: Not on file   Housing Stability:     Unable to Pay for Housing in the Last Year: Not on file    Number of Places Lived in the Last Year: Not on file    Unstable Housing in the Last Year: Not on file        Review of Symptoms:    Pertinent items are noted in HPI    Review of systems reviewed from Patient History Form dated on today's date and   available in the patient's chart under the Media tab. Vital Signs: There were no vitals filed for this visit. General Exam:     Constitutional: Patient is adequately groomed with no evidence of malnutrition  Mental Status: The patient is oriented to time, place and person. The patient's mood and affect are appropriate. Vascular: Examination reveals no swelling or calf tenderness. Peripheral pulses are palpable and 2+. Lymphatics: no lymphadenopathy of the inguinal region or lower extremity      Physical Exam: left knee      Primary Exam:    Inspection: Subtle asymmetric fullness popliteal space no deformity or atrophy      Palpation: Mild tenderness in the medial popliteal space       Range of Motion: Full range symmetric low-grade discomfort endrange flexion      Strength: Normal with SLR      Special Tests:   Lachman test negative, collateral ligament stressing stable, anterior/posterior drawer negative, medial and lateral Zackery testing negative, patella femoral provacative Negative      Skin: There are no rashes, ulcerations or lesions. Gait: Antalgic      Reflex intact lower     Additional Comments:        Additional Examinations:           Right Lower Extremity: Examination of the right lower extremity does not show any tenderness, deformity or injury. Range of motion is unremarkable. There is no gross instability. There are no rashes, ulcerations or lesions. Strength and tone are normal.   Neurolgic -Light touch sensation and manual muscle testing normal L2-S1. No fasiculations.  Pattella tendon and Achilles tendon reflexes +2 bilaterally. Seated SLR negative        Office Imaging Results/Procedures PerformedToday:      Radiology:      X-rays obtained and reviewed in office:   Views 4 views left knee comparison view standing//roxana right knee   Location left knee   Impression grade 1-2 degenerative change affecting bilateral knees lateral projection demonstrates patellofemoral joint is anatomic. Incidental note of fabella. There is a area of sclerosis within the medial  femoral condyle consistent with bone island this is well-circumscribed. No expansile destructive changes       Office Procedures:     Orders Placed This Encounter   Procedures    XR KNEE LEFT (MIN 4 VIEWS)     Standing Status:   Future     Number of Occurrences:   1     Standing Expiration Date:   1/12/2023     Order Specific Question:   Reason for exam:     Answer:   pain    XR KNEE RIGHT (3 VIEWS)     Standing Status:   Future     Number of Occurrences:   1     Standing Expiration Date:   1/12/2023     Order Specific Question:   Reason for exam:     Answer:   pain           Other Outside Imaging and Testing Personally Reviewed:    XR KNEE RIGHT (3 VIEWS)    Result Date: 1/12/2022  Radiology exam is complete. No Radiologist dictation. Please follow up with ordering provider. XR KNEE LEFT (MIN 4 VIEWS)    Result Date: 1/12/2022  Radiology exam is complete. No Radiologist dictation. Please follow up with ordering provider. US SOFT TISSUE LIMITED AREA    Result Date: 1/7/2022  EXAMINATION: SOFT TISSUE ULTRASOUND 1/7/2022 2:56 pm COMPARISON: None. HISTORY: ORDERING SYSTEM PROVIDED HISTORY: Popliteal pain TECHNOLOGIST PROVIDED HISTORY: This procedure can be scheduled via [employer]. Access your [employer] account by visiting [facility]. Reason for exam:->L popliteal fossa/proximal calf pain daily.  eval for baker cyst Reason for Exam: lt pop fossa pain x 10 years Additional signs and symptoms: n/a Relevant Medical/Surgical History: n/a FINDINGS: Targeted ultrasound evaluation of the popliteal fossa was performed. Within popliteal fossa, there is a 3.1 x 2.2 x 2.2 cm cystic lesion seen without significant internal vascularity or surrounding hyperemia, likely reflecting Baker's cyst.     3.1 x 2.2 x 2.2 cm cystic lesion seen in the popliteal fossa, suspicious for underlying Baker's cyst.              Assessment   Impression: . Encounter Diagnoses   Name Primary?  Synovial cyst of popliteal space, left     Primary osteoarthritis of left knee Yes    Left knee pain, unspecified chronicity               Plan:       High-dose NSAIDs-Toradol along with elastic compression  Consider ultrasound-guided aspiration and injection with Celestone on follow-up if symptoms show no appreciable change  Consider biologic orthopedic injection as an alternative if this is refractory to aggressive conservative management      The nature of the finding, probable diagnosis and likely treatment was thoroughly discussed with the patient. The options, risks, complications, alternative treatment as well as some of the differential diagnosis was discussed. The patient was thoroughly informed and all questions were answered. the patient indicated understanding and satisfaction with the discussion. Orders:        Orders Placed This Encounter   Procedures    XR KNEE LEFT (MIN 4 VIEWS)     Standing Status:   Future     Number of Occurrences:   1     Standing Expiration Date:   1/12/2023     Order Specific Question:   Reason for exam:     Answer:   pain    XR KNEE RIGHT (3 VIEWS)     Standing Status:   Future     Number of Occurrences:   1     Standing Expiration Date:   1/12/2023     Order Specific Question:   Reason for exam:     Answer:   pain           Disclaimer: \"This note was dictated with voice recognition software. Though review and correction are routine, we apologize for any errors. \"

## 2022-01-28 ENCOUNTER — TELEPHONE (OUTPATIENT)
Dept: ORTHOPEDIC SURGERY | Age: 52
End: 2022-01-28

## 2022-02-02 ENCOUNTER — OFFICE VISIT (OUTPATIENT)
Dept: ORTHOPEDIC SURGERY | Age: 52
End: 2022-02-02

## 2022-02-02 VITALS — WEIGHT: 213 LBS | BODY MASS INDEX: 30.49 KG/M2 | HEIGHT: 70 IN

## 2022-02-02 DIAGNOSIS — M17.12 PRIMARY OSTEOARTHRITIS OF LEFT KNEE: ICD-10-CM

## 2022-02-02 DIAGNOSIS — M25.562 POSTERIOR LEFT KNEE PAIN: Primary | ICD-10-CM

## 2022-02-02 PROCEDURE — G8482 FLU IMMUNIZE ORDER/ADMIN: HCPCS | Performed by: INTERNAL MEDICINE

## 2022-02-02 PROCEDURE — 99214 OFFICE O/P EST MOD 30 MIN: CPT | Performed by: INTERNAL MEDICINE

## 2022-02-02 PROCEDURE — G8417 CALC BMI ABV UP PARAM F/U: HCPCS | Performed by: INTERNAL MEDICINE

## 2022-02-02 PROCEDURE — 4004F PT TOBACCO SCREEN RCVD TLK: CPT | Performed by: INTERNAL MEDICINE

## 2022-02-02 PROCEDURE — G8427 DOCREV CUR MEDS BY ELIG CLIN: HCPCS | Performed by: INTERNAL MEDICINE

## 2022-02-02 PROCEDURE — 3017F COLORECTAL CA SCREEN DOC REV: CPT | Performed by: INTERNAL MEDICINE

## 2022-02-02 NOTE — PROGRESS NOTES
Chief Complaint:   Chief Complaint   Patient presents with    Knee Pain     Left knee bakers cyst f/u. Pain is 5-6/10. Torodal helped for 4 days but came back on day 5          History of Present Illness:       Patient is a 49 y.o. male returns follow up for the above complaint. The patient was last seen approximately 3 weeksago. The symptoms are improving since the last visit. The patient has had no further testing for the problem. He noted short-term therapeutic benefit from trial of Toradol and was diligent with elastic compression, however symptoms have resurfaced he rates the pain is 5-6/10. He denies any neuritic character/quality of pain is localized to the proximal calf/posterior knee. No mechanical symptoms    Active related swelling    We have previously discussed aspiration and injection of the popliteal synovial cyst that was identified on lower extremity venous Doppler utilized to proceed with this today. Past Medical History:        Past Medical History:   Diagnosis Date    Asthma     Mitral regurgitation 2007    PUD (peptic ulcer disease) 1983        Present Medications:         Current Outpatient Medications   Medication Sig Dispense Refill    ketorolac (TORADOL) 10 MG tablet Take 1 tablet by mouth three times daily Discontinue all other NSAIDs during the course of treatment and resumed thereafter 15 tablet 0    Probiotic Product (PROBIOTIC-10 PO) Take by mouth      Multiple Vitamins-Minerals (THERAPEUTIC MULTIVITAMIN-MINERALS) tablet Take 1 tablet by mouth daily. No current facility-administered medications for this visit. Allergies:      No Known Allergies        Review of Systems:    Pertinent items are noted in HPI        Vital Signs: There were no vitals filed for this visit.      General Exam:     Constitutional: Patient is adequately groomed with no evidence of malnutrition    Physical Exam: left knee      Primary Exam:    Inspection: No deformity synovial cyst previously described on lower extremity venous Doppler study. This may represent an incidental finding and not be the cause of his persistent pain. Proceed as outlined above    Approximately  30  minutes was spent related to previewing pertinent medical documentation prior to the patient's visit along with counseling during the patient's visit with respect to treatment options inclusive of alternatives to treatment and the complications and risks related to those treatment options along with expectations of outcome related to those treatments and inclusive of time in the documentation and ordering of testing and treatment after the visit. Orders:      No orders of the defined types were placed in this encounter. Estelita Woodruff MD.      Disclaimer: \"This note was dictated with voice recognition software. Though review and correction are routine, we apologize for any errors. \"

## 2022-02-08 ENCOUNTER — TELEPHONE (OUTPATIENT)
Dept: ORTHOPEDIC SURGERY | Age: 52
End: 2022-02-08

## 2022-02-09 ENCOUNTER — TELEPHONE (OUTPATIENT)
Dept: ORTHOPEDIC SURGERY | Age: 52
End: 2022-02-09

## 2022-02-09 DIAGNOSIS — F40.240 CLAUSTROPHOBIA: Primary | ICD-10-CM

## 2022-02-09 RX ORDER — DIAZEPAM 5 MG/1
TABLET ORAL
Qty: 2 TABLET | Refills: 0 | Status: SHIPPED | OUTPATIENT
Start: 2022-02-09 | End: 2022-02-23

## 2022-02-09 RX ORDER — METHYLPREDNISOLONE 4 MG/1
TABLET ORAL
Qty: 1 KIT | Refills: 0 | Status: SHIPPED | OUTPATIENT
Start: 2022-02-09 | End: 2022-03-01 | Stop reason: ALTCHOICE

## 2022-02-09 NOTE — TELEPHONE ENCOUNTER
Spoke to pt, having MRI tomorrow and has severe claustrophobia, requesting medication to get through it. Offered an alternate location with larger/open MRI but pt declined stating his knee pain is severe enough that he does not want to put it off. [facility]acy is [facility] [address]. Please advise.

## 2022-02-09 NOTE — TELEPHONE ENCOUNTER
Other PATIENT STATES THAT HE IS SCHEDULED TO GET MRI TOMORROW 2/10/22 AND HE WILL BE NEEDING SOME MEDICATION BECAUSE HE IS CLOSTROPHIC.  PLEASE CALL TO [facility] [address] [place], [place] [address]th [facility], [address] [facility] P [phone] - F [phone]

## 2022-02-10 NOTE — TELEPHONE ENCOUNTER
Spoke to pt to advise. Pt v/u and p/u both Rx and will start medrol tomorrow. F/U sched for [facility] MRI for 2/15/22.

## 2022-02-15 ENCOUNTER — OFFICE VISIT (OUTPATIENT)
Dept: ORTHOPEDIC SURGERY | Age: 52
End: 2022-02-15

## 2022-02-15 DIAGNOSIS — S86.112S: Primary | ICD-10-CM

## 2022-02-15 DIAGNOSIS — M79.18 MUSCLE PAIN, MYOFASCIAL: ICD-10-CM

## 2022-02-15 DIAGNOSIS — M71.22 POPLITEAL SYNOVIAL CYST, LEFT: ICD-10-CM

## 2022-02-15 PROCEDURE — G8417 CALC BMI ABV UP PARAM F/U: HCPCS | Performed by: INTERNAL MEDICINE

## 2022-02-15 PROCEDURE — 4004F PT TOBACCO SCREEN RCVD TLK: CPT | Performed by: INTERNAL MEDICINE

## 2022-02-15 PROCEDURE — 99214 OFFICE O/P EST MOD 30 MIN: CPT | Performed by: INTERNAL MEDICINE

## 2022-02-15 PROCEDURE — G8482 FLU IMMUNIZE ORDER/ADMIN: HCPCS | Performed by: INTERNAL MEDICINE

## 2022-02-15 PROCEDURE — 3017F COLORECTAL CA SCREEN DOC REV: CPT | Performed by: INTERNAL MEDICINE

## 2022-02-15 PROCEDURE — G8428 CUR MEDS NOT DOCUMENT: HCPCS | Performed by: INTERNAL MEDICINE

## 2022-02-15 RX ORDER — CYCLOBENZAPRINE HCL 10 MG
10 TABLET ORAL NIGHTLY PRN
Qty: 30 TABLET | Refills: 1 | Status: SHIPPED | OUTPATIENT
Start: 2022-02-15

## 2022-02-15 NOTE — PROGRESS NOTES
Chief Complaint:   Chief Complaint   Patient presents with    Knee Pain     Left posterior knee pain remains problematic MRI completed          History of Present Illness:       Patient is a 49 y.o. male returns follow up for the above complaint. The patient was last seen approximately 2 weeksago. The symptoms show no change since the last visit. The patient has had further testing for the problem. MRI was completed in the interim. Symptoms are unchanged and in the interim was prescribed this course of Medrol with no therapeutic benefit    He localizes the pain in the proximal medial calf region. MRI findings as outlined below revealed evidence of Baker's cyst with large locules superior to medial head gastrocnemius origin which is much more proximal than the focus of his pain. He denies any neuritic character symptoms involving the lower extremity and his symptoms do not follow neurogenic claudication pattern    The symptoms are more notable is nocturnal pain with his leg extended. Pain levels 8/10    No apparent active related swelling    He denies any constitutional symptoms    He denies any mechanical symptoms         Past Medical History:        Past Medical History:   Diagnosis Date    Asthma     Mitral regurgitation 2007    PUD (peptic ulcer disease) 1983        Present Medications:         Current Outpatient Medications   Medication Sig Dispense Refill    cyclobenzaprine (FLEXERIL) 10 MG tablet Take 1 tablet by mouth nightly as needed for Muscle spasms 30 tablet 1    diazePAM (VALIUM) 5 MG tablet Take 1 tab 15-30 min prior to MRI and repeat dose as needed. 2 tablet 0    methylPREDNISolone (MEDROL, SAJI,) 4 MG tablet By mouth.  1 kit 0    ketorolac (TORADOL) 10 MG tablet Take 1 tablet by mouth three times daily Discontinue all other NSAIDs during the course of treatment and resumed thereafter 15 tablet 0    Probiotic Product (PROBIOTIC-10 PO) Take by mouth      Multiple Vitamins-Minerals (THERAPEUTIC MULTIVITAMIN-MINERALS) tablet Take 1 tablet by mouth daily. No current facility-administered medications for this visit. Allergies:      No Known Allergies        Review of Systems:    Pertinent items are noted in HPI         Vital Signs: There were no vitals filed for this visit. General Exam:     Constitutional: Patient is adequately groomed with no evidence of malnutrition    Physical Exam: left knee      Primary Exam:    Inspection: No deformity atrophy appreciable effusion      Palpation: There is focal tenderness in the proximal muscle distribution of the gastrocnemius just below the popliteal space      Range of Motion: Full range and symmetric      Strength: Normal active plantar flexion      Special Tests: Negative SLR      Skin: There are no rashes, ulcerations or lesions. Gait: Nonantalgic      Neurovascular - non focal and intact       Additional Comments:        Additional Examinations:                Office Imaging Results/Procedures PerformedToday:         Office Procedures:     Orders Placed This Encounter   Procedures    US EXTREMITY LEFT NON VASC LIMITED     Standing Status:   Future     Number of Occurrences:   1     Standing Expiration Date:   2/15/2023     Order Specific Question:   Reason for exam:     Answer:   dx    Ambulatory referral to Physical Therapy     Referral Priority:   Routine     Referral Type:   Eval and Treat     Referral Reason:   Specialty Services Required     Requested Specialty:   Physical Therapy     Number of Visits Requested:   1     Limited ultrasound evaluation-left knee  Logiq E ultrasound 5MHZ    Patient position prone on examination table. Curvilinear transducer was utilized. The posterior medial compartment of the thigh was evaluated at the level just proximal to the medial femoral condyle.   There was a cystic density that was opposed between the semimembranosus and the proximal gastrocnemius tendon correlating with the findings on MRI. The cyst measured 2. 07 cm posterior to anterior and 1.81 cm medial to lateral.  Power Doppler imaging negative  The cyst was medial to the neurovascular bundle. There was no tenderness to sono palpation over the cyst.    The area of maximal tenderness was then evaluated and corresponds to the proximal gastrocnemius muscle. There was no evidence of intrinsic signal abnormality in this anatomic region. No other soft tissue abnormalities were appreciated. Other Outside Imaging and Testing Personally Reviewed:    MRI LOWER EXTREMITY W JT WO CONTRAST    Result Date: 2022  [facility] #: [id number] #: [id number] Procedure: MR Left Knee w/o Contrast ; Reason for Exam: Dx, medial meniscus tear, ganglion cyst This document is confidential medical information. Unauthorized disclosure or use of this information is prohibited by law. If you are not the intended recipient of this document, please advise us by calling immediately [phone]. [facility] [place] [address] [address] Patient Name: David Orantes Case ID: [id number] Patient : 1970 Referring Physician: Paris Chisholm MD Exam Date: 02/10/2022 Exam Description: MR Left Knee w/o Contrast HISTORY:   Patient diagnosed with medial meniscus tear, ganglion cyst. TECHNICAL FACTORS:  Long- and short-axis fat- and water-weighted images were performed. COMPARISON:  None. FINDINGS:   The body and posterior horn medial meniscus are slightly diminutive, with a horizontal intermediate signal tear at body/posterior horn junction. Medial tibiofemoral cartilage is maintained. Lateral meniscus and lateral tibiofemoral cartilage are preserved. Patellofemoral cartilage is maintained. The ACL is intact but hyperintense with small cyst proximally. Heterogeneous 1.6 by 1.5 x 1.2 cm ovoid mass anterior to distal ACL is concerning for cyclops lesion.   Small cysts are seen in  the tibial eminence at ACL insertion. PCL is intact. Medial and lateral collateral ligament complex structures are intact. Muscles and tendons are normal throughout the knee. Trace effusion is seen, with retractile fibrotic appearance of Hoffa's fat. A Baker's cyst is seen with large locule superior to medial head gastrocnemius origin. Neurovascular structures are intact. CONCLUSION: 1. Diminutive body and posterior and medial meniscus suggest prior debridement, with intermediate signal horizontal tear body/posterior horn junction. 2. Hyperintense ACL, sprain versus mucoid degeneration, with proximal intrasubstance cyst as well as ganglion cysts in tibial eminence at insertion. 3. Mass concerning for cyclops lesion anterior to distal ACL. Fibrotic, retractile Hoffa's fat  pad. 4. Baker's cyst with large locules superior to medial head gastrocnemius origin. Trace effusion. Thank you for the opportunity to provide your interpretation. Nissa Arzate MD PHD A: MS 02/11/2022 9:26 AM              Assessment   Impression: . Encounter Diagnoses   Name Primary?  Gastrocnemius muscle strain, left, sequela Yes    Muscle pain, myofascial     Popliteal synovial cyst, left         Far proximal popliteal synovial cyst interposed between semimembranosus muscle and origin of the medial gastrocnemius . Plan:      Active modification avoidance of impact activities for now  Continue Achilles tendon stretching program   Trial of Flexeril nightly and formal course of PT myofascial program      I do not believe the cyst is the current etiology for his pain given the reproducibility of his pain on manual examination at an anatomic location distinctly separate from the location of the cyst.  Proceed as outlined above    Approximately  30minutes was spent related to previewing pertinent medical documentation prior to the patient's visit along with counseling during the patient's visit with respect to treatment options inclusive of alternatives to treatment and the complications and risks related to those treatment options along with expectations of outcome related to those treatments and inclusive of time in the documentation and ordering of testing and treatment after the visit. Orders:        Orders Placed This Encounter   Procedures    US EXTREMITY LEFT NON VASC LIMITED     Standing Status:   Future     Number of Occurrences:   1     Standing Expiration Date:   2/15/2023     Order Specific Question:   Reason for exam:     Answer:   dx    Ambulatory referral to Physical Therapy     Referral Priority:   Routine     Referral Type:   Eval and Treat     Referral Reason:   Specialty Services Required     Requested Specialty:   Physical Therapy     Number of Visits Requested:   1         Kristy Valadez MD.      Marlo Ped: \"This note was dictated with voice recognition software. Though review and correction are routine, we apologize for any errors. \"

## 2022-02-18 ENCOUNTER — HOSPITAL ENCOUNTER (OUTPATIENT)
Dept: PHYSICAL THERAPY | Age: 52
Setting detail: THERAPIES SERIES
Discharge: HOME | End: 2022-02-18
Payer: COMMERCIAL

## 2022-02-18 PROCEDURE — 97161 PT EVAL LOW COMPLEX 20 MIN: CPT

## 2022-02-18 PROCEDURE — 97140 MANUAL THERAPY 1/> REGIONS: CPT

## 2022-02-18 NOTE — FLOWSHEET NOTE
[employer]    Physical Therapy Treatment Note/ Progress Report:     Date:  2022    Patient Name:  Liliya Potter    :  1970  MRN: [id number]  Medical/Treatment Diagnosis Information:  Diagnosis: Muscle Pain (L) M79.18   S82.112S Gastroc Strain (L), Sequela  Treatment Diagnosis: (L) LE Pain.   Soft Tissue Dysfunction (L) Calf musculature  Insurance/Certification information:  PT Insurance Information: [facility]  Physician Information:  Referring Practitioner: Dr. Lucas Lake of care signed (Y/N):     Date of Patient follow up with Physician:      Progress Report: []  Yes  [x]  No     Functional Scale: LEFS 0% LOF   Date:2022    Date Range for reporting period:  Beginnin2022  Ending:  3/18/2022    Progress report due (10 Rx/or 30 days whichever is less): 4496     Recertification due (POC duration/ or 90 days whichever is less): 3/18/2022    Visit # Insurance Allowable Auth Needed   1 61 PCY []Yes    []No     Pain level:  2/10 currently  9/10 at worst     SUBJECTIVE:  See eval    OBJECTIVE: See eval   Observation:    Test measurements:      RESTRICTIONS/PRECAUTIONS:  none    Exercises/Interventions:     Therapeutic Ex 6' Resistance Sets/sec Reps Notes   Standing Gastroc/Soleus Stretch IB 30\" 3 ea    Seated hurdler HSS  10\" 1 Stopped  due to reproduction of pain                                                                           Therapeutic Activities                                                               Manual Intervention x 12'       Knee mobs/PROM       Tib/Fem Mobs       Patella Mobs       IASTM (L) Gastroc/Soleus 12'                   NMR re-education                                                                          Therapeutic Exercise and NMR EXR  [x] (93004) Provided verbal/tactile cueing for activities related to strengthening, flexibility, endurance, ROM for improvements in LE, proximal hip, and core control with self care, mobility, lifting, ambulation.  [] (01861) Provided verbal/tactile cueing for activities related to improving balance, coordination, kinesthetic sense, posture, motor skill, proprioception  to assist with LE, proximal hip, and core control in self care, mobility, lifting, ambulation and eccentric single leg control. NMR and Therapeutic Activities:    [] (39238 or 93397) Provided verbal/tactile cueing for activities related to improving balance, coordination, kinesthetic sense, posture, motor skill, proprioception and motor activation to allow for proper function of core, proximal hip and LE with self care and ADLs  [] (89150) Gait Re-education- Provided training and instruction to the patient for proper LE, core and proximal hip recruitment and positioning and eccentric body weight control with ambulation re-education including up and down stairs     Home Exercise Program:    [x] (94322) Reviewed/Progressed HEP activities related to strengthening, flexibility, endurance, ROM of core, proximal hip and LE for functional self-care, mobility, lifting and ambulation/stair navigation   [] (40002)Reviewed/Progressed HEP activities related to improving balance, coordination, kinesthetic sense, posture, motor skill, proprioception of core, proximal hip and LE for self care, mobility, lifting, and ambulation/stair navigation      Manual Treatments:  PROM / STM / Oscillations-Mobs:  G-I, II, III, IV (PA's, Inf., Post.)  [x] (98860) Provided manual therapy to mobilize LE, proximal hip and/or LS spine soft tissue/joints for the purpose of modulating pain, promoting relaxation,  increasing ROM, reducing/eliminating soft tissue swelling/inflammation/restriction, improving soft tissue extensibility and allowing for proper ROM for normal function with self care, mobility, lifting and ambulation.      Modalities:      Charges:  Timed Code Treatment Minutes: 18'   Total Treatment Minutes: 39' [x] EVAL (LOW) 51947 (typically 20 minutes face-to-face)  [] EVAL (MOD) 60137 (typically 30 minutes face-to-face)  [] EVAL (HIGH) 53379 (typically 45 minutes face-to-face)  [] RE-EVAL     [] QU(48472) x     [] IONTO (96544)  [] NMR (89928) x     [] VASO (67676)  [x] Manual (16999) x1     [] Other:  [] TA (13829)x     [] Mech Traction (18659)  [] ES(attended) (19970)     [] ES (un) (60181): If BWC Please Indicate Time In/Out and Total Minutes  CPT Code Time in Time out Total Min                                           GOALS:  Patient stated goal: be able to Sleep  [] Progressing: [] Met: [] Not Met: [] Adjusted    Therapist goals for Patient:   Short Term Goals: To be achieved in: 2 weeks  1. Independent in HEP and progression per patient tolerance, in order to prevent re-injury. [] Progressing: [] Met: [] Not Met: [] Adjusted  2. Patient will have a decrease in pain to facilitate improvement in movement, function, and ADLs as indicated by Functional Deficits. [] Progressing: [] Met: [] Not Met: [] Adjusted    Long Term Goals: To be achieved in: 4 weeks  1  Patient will demonstrate symmetrical gastroc flexibility (B) LE with no pain during testing in order to demonstrate functional improvement for daily and work activities. [] Progressing: [] Met: [] Not Met: [] Adjusted  2. Patient will be able to sleep for 6 hours or more without disruption due to the calf for adequate rest at night. [] Progressing: [] Met: [] Not Met: [] Adjusted  4. Patient will demonstrate a 50% or > reduction in (L) calf pain at rest in order to return to prior level of function. [] Progressing: [] Met: [] Not Met: [] Adjusted         Progression Towards Functional goals:  [] Patient is progressing as expected towards functional goals listed. [] Progression is slowed due to complexities listed. [] Progression has been slowed due to co-morbidities.   [x] Plan just implemented, too soon to assess goals progression  [] Other: ASSESSMENT:  See eval    Return to Play: (if applicable)   []  Stage 1: Intro to Strength   []  Stage 2: Return to Run and Strength   []  Stage 3: Return to Jump and Strength   []  Stage 4: Dynamic Strength and Agility   []  Stage 5: Sport Specific Training     []  Ready to Return to Play, Meets All Above Stages   []  Not Ready for Return to Sports   Comments:            Treatment/Activity Tolerance:  [x] Patient tolerated treatment well [] Patient limited by fatique  [] Patient limited by pain  [] Patient limited by other medical complications  [] Other:     Overall Progression Towards Functional goals/ Treatment Progress Update:  [] Patient is progressing as expected towards functional goals listed. [] Progression is slowed due to complexities/Impairments listed. [] Progression has been slowed due to co-morbidities. [x] Plan just implemented, too soon to assess goals progression <30days   [] Goals require adjustment due to lack of progress  [] Patient is not progressing as expected and requires additional follow up with physician  [] Other    Prognosis for POC: [x] Good [] Fair  [] Poor    Patient requires continued skilled intervention: [x] Yes  [] No        PLAN: See eval  [] Continue per plan of care [] Alter current plan (see comments)  [x] Plan of care initiated [] Hold pending MD visit [] Discharge    Electronically signed by: Frances Cavazos PT    Note: If patient does not return for scheduled/recommended follow up visits, this note will serve as a discharge from care along with the most recent update on progress.

## 2022-02-18 NOTE — PLAN OF CARE
[address] [address]  Phone [phone]   Fax [phone]                                                       Physical Therapy Certification    Dear Referring Practitioner: Dr. Marylene Bidding,    We had the pleasure of evaluating the following patient for physical therapy services at [address] [place] [address]. A summary of our findings can be found in the initial assessment below. This includes our plan of care. If you have any questions or concerns regarding these findings, please do not hesitate to contact me at the office phone number checked above. Thank you for the referral.       Physician Signature:_______________________________Date:__________________  By signing above (or electronic signature), therapists plan is approved by physician      Patient: Loree Coleman   : 1970   MRN: [id number]  Referring Physician: Referring Practitioner: Dr. Marylene Bidding      Evaluation Date: 2022      Medical Diagnosis Information:  Diagnosis: Muscle Pain (L) M79.18   S82.112S Gastroc Strain (L), Sequela   Treatment Diagnosis: (L) LE Pain. Soft Tissue Dysfunction (L) Calf musculature                                         Insurance information: PT Insurance Information: [facility]     Precautions/ Contra-indications: none    C-SSRS Triggered by Intake questionnaire (Past 2 wk assessment):   [x] No, Questionnaire did not trigger screening.   [] Yes, Patient intake triggered further evaluation      [] C-SSRS Screening completed  [] PCP notified via Plan of Care  [] Emergency services notified     Latex Allergy:  [x]NO      []YES  Preferred Language for Healthcare:   [x]English       []other:    SUBJECTIVE: Patient stated complaint: off and on posterior (L) knee pain for years with no known mechanism of injury.   Pt it has worsened more recently and is worst at night. Pt notes his sleep is signicantly disrupted due to the pain. Pt note sthe pain is very sharp/throbbing and localized to the posterior knee. Pt saw his PCP and was referred to Dr. Oscar Wills for further assessment. Pt had an ultrasound that revealed suspicion of a cyst/bakers cyst. MRI was also completed and was negative for any other findings. Pt was prescribed a steroid pack as well as a muscle relaxor. Neither provided relief. Pt has now been referred to formal physical therapy to include dry needling. Pt denies any N/T in his leg or any radiating pain. Pt denies any significant low back related hx, or any unexpected weight loss.         Relevant Medical History: (L) knee scope in 2016  Functional Disability Index: LEFS 0% LOF    Pain Scale: 2/10 currently   9/10 at worst  Easing factors:  Sleeping on the floor, stretching the calf  Provocative factors: sleeping in bed (independent of position)    Type: []Constant   []Intermittent  []Radiating [x]Localized []other:     Numbness/Tingling:  None    Occupation/School: Works at a [employer] Status/Prior Level of Function: Independent with ADLs and IADLs,      OBJECTIVE:     ROM LEFT RIGHT   HIP Flex WNL WNL   HIP Abd WNL WNL   HIP Ext WNL WNL   HIP IR WNL WNL   HIP ER WNL WNL   Knee ext WNL WNL   Knee Flex WNL No pain with OP WNL   Ankle PF WNL WNL   Ankle DF WNL WNL   Ankle In WNL WNL   Ankle Ev WNL WNL   Strength  LEFT RIGHT   HIP Flexors 5 5   HIP Abductors 5 5   HIP Ext     Hip ER 5 5   Knee EXT (quad) 5 5   Knee Flex (HS) 4+ 5   Ankle DF 5 5   Ankle PF 5 5   Ankle Inv 5 5   Ankle EV 5 5        Circumference  Mid apex  7 cm prox             Flexibility:  Mild restrictions (L) hamstring (90/90)   Moderate restrictions (L) Gastroc    Lumbar AROM: WNL and painfree in all planes    Balance: unremarkable      Reflexes/Sensation:    [x]Dermatomes/Myotomes intact    [x]Reflexes equal and normal bilaterally   []Other:    Joint mobility:   [x]Normal    []Hypo   []Hyper    Palpation: multiple trigger points and pain reproduced with palpation (L) gastroc  Medially > laterally    Functional Mobility/Transfers: independent    Pt able to demonstrate good control with functional squat to 90 deg     Posture:  NA    Bandages/Dressings/Incisions:  NA    Gait: (include devices/WB status)  WNL    Orthopedic Special Tests:  (-) Lachman's  (-)Varus/Valgus,  (-) McMurrays    (-)Homans,     (-) SLR (B)                       [x] Patient history, allergies, meds reviewed. Medical chart reviewed. See intake form. Review Of Systems (ROS):  [x]Performed Review of systems (Integumentary, CardioPulmonary, Neurological) by intake and observation. Intake form has been scanned into medical record. Patient has been instructed to contact their primary care physician regarding ROS issues if not already being addressed at this time.       Co-morbidities/Complexities (which will affect course of rehabilitation):  []None           Arthritic conditions   []Rheumatoid arthritis (M05.9)  []Osteoarthritis (M19.91)   Cardiovascular conditions   []Hypertension (I10)  []Hyperlipidemia (E78.5)  []Angina pectoris (I20)  []Atherosclerosis (I70)   Musculoskeletal conditions   []Disc pathology   []Congenital spine pathologies   []Prior surgical intervention  []Osteoporosis (M81.8)  []Osteopenia (M85.8)   Endocrine conditions   []Hypothyroid (E03.9)  []Hyperthyroid Gastrointestinal conditions   []Constipation (U42.60)   Metabolic conditions   []Morbid obesity (E66.01)  []Diabetes type 1(E10.65) or 2 (E11.65)   []Neuropathy (G60.9)     Pulmonary conditions   []Asthma (J45)  []Coughing   []COPD (J44.9)   Psychological Disorders  []Anxiety (F41.9)  []Depression (F32.9)   []Other:   []Other:          Barriers to/and or personal factors that will affect rehab potential:              []Age  []Sex              []Motivation/Lack of Motivation                        []Co-Morbidities gastroc/calf dysfunction and pain possibly related to [employer]' Cyst   []Signs/symptoms consistent with patella-femoral syndrome   []Signs/symptoms consistent with knee OA/hip OA   []Signs/symptoms consistent with internal derangement of knee/Hip   []Signs/symptoms consistent with functional hip weakness/NMR control      []Signs/symptoms consistent with tendinitis/tendinosis    [x]signs/symptoms consistent with pathology which may benefit from Dry needling      []other:      Prognosis/Rehab Potential:      []Excellent   [x]Good    []Fair   []Poor    Tolerance of evaluation/treatment:    []Excellent   [x]Good    []Fair   []Poor    Physical Therapy Evaluation Complexity Justification  [] A history of present problem with:  [x] no personal factors and/or comorbidities that impact the plan of care;  []1-2 personal factors and/or comorbidities that impact the plan of care  []3 personal factors and/or comorbidities that impact the plan of care  [] An examination of body systems using standardized tests and measures addressing any of the following: body structures and functions (impairments), activity limitations, and/or participation restrictions;:  [] a total of 1-2 or more elements   [x] a total of 3 or more elements   [] a total of 4 or more elements   [] A clinical presentation with:  [x] stable and/or uncomplicated characteristics   [] evolving clinical presentation with changing characteristics  [] unstable and unpredictable characteristics;   [] Clinical decision making of [x] low, [] moderate, [] high complexity using standardized patient assessment instrument and/or measurable assessment of functional outcome.     [x] EVAL (LOW) 99404 (typically 30 minutes face-to-face)  [] EVAL (MOD) 33332 (typically 30 minutes face-to-face)  [] EVAL (HIGH) 51060 (typically 45 minutes face-to-face)  [] RE-EVAL     PLAN:   Frequency/Duration:  1-2 days per week for 4 Weeks:  Interventions:  [x]  Therapeutic exercise including: strength training, ROM, for Lower extremity and core   [x]  NMR activation and proprioception for LE, Glutes and Core   [x]  Manual therapy as indicated for LE, Hip and spine to include: Dry Needling/IASTM, STM, PROM, Gr I-IV mobilizations, manipulation. [x] Modalities as needed that may include: thermal agents, E-stim, Biofeedback, US, iontophoresis as indicated  [x] Patient education on joint protection, postural re-education, activity modification, progression of HEP. HEP instruction: Pt instructed to continue with gastroc stretching, instructed to add soleus stretching. Pt instructed on sleeping posture including the use of a pillow under lower leg to help decreased prolonged tension on posterior knee/calf area    GOALS:  Patient stated goal: be able to Sleep  [] Progressing: [] Met: [] Not Met: [] Adjusted    Therapist goals for Patient:   Short Term Goals: To be achieved in: 2 weeks  1. Independent in HEP and progression per patient tolerance, in order to prevent re-injury. [] Progressing: [] Met: [] Not Met: [] Adjusted  2. Patient will have a decrease in pain to facilitate improvement in movement, function, and ADLs as indicated by Functional Deficits. [] Progressing: [] Met: [] Not Met: [] Adjusted    Long Term Goals: To be achieved in: 4 weeks  1  Patient will demonstrate symmetrical gastroc flexibility (B) LE with no pain during testing in order to demonstrate functional improvement for daily and work activities. [] Progressing: [] Met: [] Not Met: [] Adjusted  2. Patient will be able to sleep for 6 hours or more without disruption due to the calf for adequate rest at night. [] Progressing: [] Met: [] Not Met: [] Adjusted  4. Patient will demonstrate a 50% or > reduction in (L) calf pain at rest in order to return to prior level of function.   [] Progressing: [] Met: [] Not Met: [] Adjusted       Electronically signed by:  Chirag Parrish PT

## 2022-02-23 ENCOUNTER — HOSPITAL ENCOUNTER (OUTPATIENT)
Dept: PHYSICAL THERAPY | Age: 52
Setting detail: THERAPIES SERIES
Discharge: HOME | End: 2022-02-23
Payer: COMMERCIAL

## 2022-02-23 PROCEDURE — 97140 MANUAL THERAPY 1/> REGIONS: CPT

## 2022-02-23 PROCEDURE — 20560 NDL INSJ W/O NJX 1 OR 2 MUSC: CPT

## 2022-02-23 PROCEDURE — 97110 THERAPEUTIC EXERCISES: CPT

## 2022-02-23 NOTE — FLOWSHEET NOTE
NMR re-education                                                                          Spoke with   regarding the use of Dry Needling     Dry needling manual therapy: consisted on the placement of 6 needles in the following muscles:  (L) gastroc/soleus. A 40 mm needle was inserted, piston, rotated, and coned to produce intramuscular mobilization. These techniques were used to restore functional range of motion, reduce muscle spasm and induce healing in the corresponding musculature. (71982)  Clean Technique was utilized today while applying Dry needling treatment. The treatment sites where cleaned with 70% solution of  isopropyl alcohol . The PT washed their hands and utilized treatment gloves along with hand  prior to inserting the needles. All needles where removed and discarded in the appropriate sharps container. MD has given verbal and/or written approval for this treatment. Therapeutic Exercise and NMR EXR  [x] (93841) Provided verbal/tactile cueing for activities related to strengthening, flexibility, endurance, ROM for improvements in LE, proximal hip, and core control with self care, mobility, lifting, ambulation.  [] (25896) Provided verbal/tactile cueing for activities related to improving balance, coordination, kinesthetic sense, posture, motor skill, proprioception  to assist with LE, proximal hip, and core control in self care, mobility, lifting, ambulation and eccentric single leg control.      NMR and Therapeutic Activities:    [] (67833 or 67129) Provided verbal/tactile cueing for activities related to improving balance, coordination, kinesthetic sense, posture, motor skill, proprioception and motor activation to allow for proper function of core, proximal hip and LE with self care and ADLs  [] (35586) Gait Re-education- Provided training and instruction to the patient for proper LE, core and proximal hip recruitment and positioning and eccentric body weight control with ambulation re-education including up and down stairs     Home Exercise Program:    [x] (92936) Reviewed/Progressed HEP activities related to strengthening, flexibility, endurance, ROM of core, proximal hip and LE for functional self-care, mobility, lifting and ambulation/stair navigation   [] (95706)Reviewed/Progressed HEP activities related to improving balance, coordination, kinesthetic sense, posture, motor skill, proprioception of core, proximal hip and LE for self care, mobility, lifting, and ambulation/stair navigation      Manual Treatments:  PROM / STM / Oscillations-Mobs:  G-I, II, III, IV (PA's, Inf., Post.)  [x] (20172) Provided manual therapy to mobilize LE, proximal hip and/or LS spine soft tissue/joints for the purpose of modulating pain, promoting relaxation,  increasing ROM, reducing/eliminating soft tissue swelling/inflammation/restriction, improving soft tissue extensibility and allowing for proper ROM for normal function with self care, mobility, lifting and ambulation. Modalities:      Charges:  Timed Code Treatment Minutes: 39'   Total Treatment Minutes: 39'       [] EVAL (LOW) 455 1011 (typically 20 minutes face-to-face)  [] EVAL (MOD) 17552 (typically 30 minutes face-to-face)  [] EVAL (HIGH) 96958 (typically 45 minutes face-to-face)  [] RE-EVAL     [x] BV(90766) x 1    [] IONTO (77939)  [] NMR (34674) x     [] VASO (12454)  [x] Manual (39490) x1     [x] Other: DN  [] TA (09987)x     [] [facility] T[facility] (22228)  [] ES(attended) (71001)     [] ES (un) (46423): If BWC Please Indicate Time In/Out and Total Minutes  CPT Code Time in Time out Total Min                                           GOALS:  Patient stated goal: be able to Sleep  [] Progressing: [] Met: [] Not Met: [] Adjusted    Therapist goals for Patient:   Short Term Goals: To be achieved in: 2 weeks  1. Independent in HEP and progression per patient tolerance, in order to prevent re-injury.    [] Progressing: [] Met: [] Not Met: [] Adjusted  2. Patient will have a decrease in pain to facilitate improvement in movement, function, and ADLs as indicated by Functional Deficits. [] Progressing: [] Met: [] Not Met: [] Adjusted    Long Term Goals: To be achieved in: 4 weeks  1  Patient will demonstrate symmetrical gastroc flexibility (B) LE with no pain during testing in order to demonstrate functional improvement for daily and work activities. [] Progressing: [] Met: [] Not Met: [] Adjusted  2. Patient will be able to sleep for 6 hours or more without disruption due to the calf for adequate rest at night. [] Progressing: [] Met: [] Not Met: [] Adjusted  4. Patient will demonstrate a 50% or > reduction in (L) calf pain at rest in order to return to prior level of function. [] Progressing: [] Met: [] Not Met: [] Adjusted         Progression Towards Functional goals:  [] Patient is progressing as expected towards functional goals listed. [] Progression is slowed due to complexities listed. [] Progression has been slowed due to co-morbidities. [x] Plan just implemented, too soon to assess goals progression  [] Other:     ASSESSMENT:  Significant local twitch responses noted both in the medial and lateral portions of the (L) gastroc. Pt tolerated well other than normal expected soreness. Significant improvement in flexibility and soft tissue mobility following DN and IASTM this date. Initiated strengthening program this date. Overall, pt is demonstrating progress at this time.     Return to Play: (if applicable)   []  Stage 1: Intro to Strength   []  Stage 2: Return to Run and Strength   []  Stage 3: Return to Jump and Strength   []  Stage 4: Dynamic Strength and Agility   []  Stage 5: Sport Specific Training     []  Ready to Return to Play, Meets All Above Stages   []  Not Ready for Return to Sports   Comments:            Treatment/Activity Tolerance:  [x] Patient tolerated treatment well [] Patient limited by jose  [] Patient limited by pain  [] Patient limited by other medical complications  [] Other:     Overall Progression Towards Functional goals/ Treatment Progress Update:  [] Patient is progressing as expected towards functional goals listed. [] Progression is slowed due to complexities/Impairments listed. [] Progression has been slowed due to co-morbidities. [x] Plan just implemented, too soon to assess goals progression <30days   [] Goals require adjustment due to lack of progress  [] Patient is not progressing as expected and requires additional follow up with physician  [] Other    Prognosis for POC: [x] Good [] Fair  [] Poor    Patient requires continued skilled intervention: [x] Yes  [] No        PLAN: Progress as able  [x] Continue per plan of care [] Alter current plan (see comments)  [] Plan of care initiated [] Hold pending MD visit [] Discharge    Electronically signed by: Ricky Hollins PT    Note: If patient does not return for scheduled/recommended follow up visits, this note will serve as a discharge from care along with the most recent update on progress.

## 2022-02-25 ENCOUNTER — APPOINTMENT (OUTPATIENT)
Dept: PHYSICAL THERAPY | Age: 52
End: 2022-02-25
Payer: COMMERCIAL

## 2022-03-01 ENCOUNTER — HOSPITAL ENCOUNTER (OUTPATIENT)
Dept: PHYSICAL THERAPY | Age: 52
Setting detail: THERAPIES SERIES
Discharge: HOME | End: 2022-03-01
Payer: COMMERCIAL

## 2022-03-01 PROCEDURE — 97140 MANUAL THERAPY 1/> REGIONS: CPT

## 2022-03-01 PROCEDURE — 97110 THERAPEUTIC EXERCISES: CPT

## 2022-03-01 PROCEDURE — 20560 NDL INSJ W/O NJX 1 OR 2 MUSC: CPT

## 2022-03-01 NOTE — FLOWSHEET NOTE
[facility]    Physical Therapy Treatment Note/ Progress Report:     Date:  3/1/2022    Patient Name:  Cesar Javier    :  1970  MRN: [id number]  Medical/Treatment Diagnosis Information:  Diagnosis: Muscle Pain (L) M79.18   S82.112S Gastroc Strain (L), Sequela  Treatment Diagnosis: (L) LE Pain. Soft Tissue Dysfunction (L) Calf musculature  Insurance/Certification information:  PT Insurance Information: [facility]  Physician Information:  Referring Practitioner: Dr. Scott Bristol of care signed (Y/N): Y    Date of Patient follow up with Physician:      Progress Report: []  Yes  [x]  No     Functional Scale: LEFS 0% LOF   Date:2022    Date Range for reporting period:  Beginnin2022  Ending:  3/18/2022    Progress report due (10 Rx/or 30 days whichever is less): 5/10/3012     Recertification due (POC duration/ or 90 days whichever is less): 3/18/2022    Visit # Insurance Allowable Auth Needed   2 60 PCY []Yes    []No     Pain level:  0/10 currently  4/10 at worst    SUBJECTIVE:   Pt states he has generally done well since his last session. Pt notes he had one bad night last Thursday, but has slept through the night since. Pt notes he can still notice it slightly, but that it is much more mild. Pt reports he can do his normal activities at this point. Pt does wish to continue dry needling this visit based on improvement.     OBJECTIVE:  3/1/2022   Observation: Severe Restrictions, palpable trigger points (L) gastroc and soleus   Test measurements:      SLS: > 30 sec (B)    RESTRICTIONS/PRECAUTIONS:  none    Exercises/Interventions:     Therapeutic Ex 15' Resistance Sets/sec Reps Notes   Standing Gastroc/Soleus Stretch IB 30\" 3 ea    Seated hurdler HSS  10\" 1 Stopped  due to reproduction of pain   Seated ankle Eversion/DF blue 2 12 ea  Held (doing at home)   Standing Heel Raises off step ecc focus 2 12    Soleus Raise 50lbs 3 12 Added 3/1                                                      Therapeutic Activities                                                               Manual Intervention x 16'       Knee mobs/PROM       Tib/Fem Mobs       DN 6'      IASTM (L) Gastroc/Soleus 10'                   NMR re-education x 8'       Danielu Diane Working on knee over toes/achilles stretch 3\" 12 3/1   SLS airex 30\" 2                                                         Spoke with   regarding the use of Dry Needling     Dry needling manual therapy: consisted on the placement of 6 needles in the following muscles:  (L) gastroc/soleus. A 40 mm needle was inserted, piston, rotated, and coned to produce intramuscular mobilization. These techniques were used to restore functional range of motion, reduce muscle spasm and induce healing in the corresponding musculature. (04928)  Clean Technique was utilized today while applying Dry needling treatment. The treatment sites where cleaned with 70% solution of  isopropyl alcohol . The PT washed their hands and utilized treatment gloves along with hand  prior to inserting the needles. All needles where removed and discarded in the appropriate sharps container. MD has given verbal and/or written approval for this treatment. Therapeutic Exercise and NMR EXR  [x] (14800) Provided verbal/tactile cueing for activities related to strengthening, flexibility, endurance, ROM for improvements in LE, proximal hip, and core control with self care, mobility, lifting, ambulation.  [] (59242) Provided verbal/tactile cueing for activities related to improving balance, coordination, kinesthetic sense, posture, motor skill, proprioception  to assist with LE, proximal hip, and core control in self care, mobility, lifting, ambulation and eccentric single leg control.      NMR and Therapeutic Activities:    [] (87424 or ) Provided verbal/tactile cueing for activities related to improving balance, coordination, kinesthetic sense, posture, motor skill, proprioception and motor activation to allow for proper function of core, proximal hip and LE with self care and ADLs  [] (94085) Gait Re-education- Provided training and instruction to the patient for proper LE, core and proximal hip recruitment and positioning and eccentric body weight control with ambulation re-education including up and down stairs     Home Exercise Program:    [x] (41863) Reviewed/Progressed HEP activities related to strengthening, flexibility, endurance, ROM of core, proximal hip and LE for functional self-care, mobility, lifting and ambulation/stair navigation   [] (47531)Reviewed/Progressed HEP activities related to improving balance, coordination, kinesthetic sense, posture, motor skill, proprioception of core, proximal hip and LE for self care, mobility, lifting, and ambulation/stair navigation      Manual Treatments:  PROM / STM / Oscillations-Mobs:  G-I, II, III, IV (PA's, Inf., Post.)  [x] (14234) Provided manual therapy to mobilize LE, proximal hip and/or LS spine soft tissue/joints for the purpose of modulating pain, promoting relaxation,  increasing ROM, reducing/eliminating soft tissue swelling/inflammation/restriction, improving soft tissue extensibility and allowing for proper ROM for normal function with self care, mobility, lifting and ambulation. Modalities:      Charges:  Timed Code Treatment Minutes: 39'   Total Treatment Minutes: 52'       [] EVAL (LOW) 32654 (typically 20 minutes face-to-face)  [] EVAL (MOD) 72336 (typically 30 minutes face-to-face)  [] EVAL (HIGH) 30629 (typically 45 minutes face-to-face)  [] RE-EVAL     [x] EW(68840) x 1    [] IONTO (47913)  [] NMR (85918) x     [] VASO (33967)  [x] Manual (22271) x1     [x] Other: DN  [] TA (05075)x     [] Mech Traction (12033)  [] ES(attended) (89344)     [] ES (un) (33388):      If BWC Please Indicate Time In/Out and Total Minutes  CPT Code Time in Time out Total Min                                           GOALS:  Patient stated goal: be able to Sleep  [] Progressing: [] Met: [] Not Met: [] Adjusted    Therapist goals for Patient:   Short Term Goals: To be achieved in: 2 weeks  1. Independent in HEP and progression per patient tolerance, in order to prevent re-injury. [] Progressing: [] Met: [] Not Met: [] Adjusted  2. Patient will have a decrease in pain to facilitate improvement in movement, function, and ADLs as indicated by Functional Deficits. [] Progressing: [] Met: [] Not Met: [] Adjusted    Long Term Goals: To be achieved in: 4 weeks  1  Patient will demonstrate symmetrical gastroc flexibility (B) LE with no pain during testing in order to demonstrate functional improvement for daily and work activities. [] Progressing: [] Met: [] Not Met: [] Adjusted  2. Patient will be able to sleep for 6 hours or more without disruption due to the calf for adequate rest at night. [] Progressing: [] Met: [] Not Met: [] Adjusted  4. Patient will demonstrate a 50% or > reduction in (L) calf pain at rest in order to return to prior level of function. [] Progressing: [] Met: [] Not Met: [] Adjusted         Progression Towards Functional goals:  [x] Patient is progressing as expected towards functional goals listed. [] Progression is slowed due to complexities listed. [] Progression has been slowed due to co-morbidities. [] Plan just implemented, too soon to assess goals progression  [] Other:     ASSESSMENT:  Pt reported more soreness in the calf following today's visit. Pt experienced more frequent, and intense local twitch responses with dry needling. Pt educated on the fact that these responses are a positive response, but a byproduct can be temporary achiness in the area needled. Otherwise, pt has progressed well since the last visit. Pt's main goal remains sleeping through the night and he has been able to do so since last Thursday.   Pt will be reduced to 1x/wk as needed based on current progress. Return to Play: (if applicable)   []  Stage 1: Intro to Strength   []  Stage 2: Return to Run and Strength   []  Stage 3: Return to Jump and Strength   []  Stage 4: Dynamic Strength and Agility   []  Stage 5: Sport Specific Training     []  Ready to Return to Play, Meets All Above Stages   []  Not Ready for Return to Sports   Comments:            Treatment/Activity Tolerance:  [x] Patient tolerated treatment well [] Patient limited by fatique  [] Patient limited by pain  [] Patient limited by other medical complications  [] Other:     Overall Progression Towards Functional goals/ Treatment Progress Update:  [x] Patient is progressing as expected towards functional goals listed. [] Progression is slowed due to complexities/Impairments listed. [] Progression has been slowed due to co-morbidities. [] Plan just implemented, too soon to assess goals progression <30days   [] Goals require adjustment due to lack of progress  [] Patient is not progressing as expected and requires additional follow up with physician  [] Other    Prognosis for POC: [x] Good [] Fair  [] Poor    Patient requires continued skilled intervention: [x] Yes  [] No        PLAN: 1x/wk as needed  [x] Continue per plan of care [] Alter current plan (see comments)  [] Plan of care initiated [] Hold pending MD visit [] Discharge    Electronically signed by: Demetrice Acosta PT    Note: If patient does not return for scheduled/recommended follow up visits, this note will serve as a discharge from care along with the most recent update on progress.

## 2022-03-03 ENCOUNTER — APPOINTMENT (OUTPATIENT)
Dept: PHYSICAL THERAPY | Age: 52
End: 2022-03-03
Payer: COMMERCIAL

## 2022-03-08 ENCOUNTER — HOSPITAL ENCOUNTER (OUTPATIENT)
Dept: PHYSICAL THERAPY | Age: 52
Setting detail: THERAPIES SERIES
Discharge: HOME | End: 2022-03-08
Payer: COMMERCIAL

## 2022-03-08 PROCEDURE — 97112 NEUROMUSCULAR REEDUCATION: CPT

## 2022-03-08 PROCEDURE — 97110 THERAPEUTIC EXERCISES: CPT

## 2022-03-08 PROCEDURE — 97140 MANUAL THERAPY 1/> REGIONS: CPT

## 2022-03-08 NOTE — FLOWSHEET NOTE
[facility]    Physical Therapy Treatment Note/ Progress Report:     Date:  3/8/2022    Patient Name:  Dagmar Rodriguez    :  1970  MRN: [id number]  Medical/Treatment Diagnosis Information:  Diagnosis: Muscle Pain (L) M79.18   S82.112S Gastroc Strain (L), Sequela  Treatment Diagnosis: (L) LE Pain. Soft Tissue Dysfunction (L) Calf musculature  Insurance/Certification information:  PT Insurance Information: [facility]  Physician Information:  Referring Practitioner: Dr. Deion Mccoy of care signed (Y/N): Y    Date of Patient follow up with Physician:      Progress Report: []  Yes  [x]  No     Functional Scale: LEFS 0% LOF   Date:2022    Date Range for reporting period:  Beginnin2022  Ending:  3/18/2022    Progress report due (10 Rx/or 30 days whichever is less):      Recertification due (POC duration/ or 90 days whichever is less): 3/18/2022    Visit # Insurance Allowable Auth Needed   3 60 PCY []Yes    []No     Pain level:  0/10 currently  4/10 at worst    SUBJECTIVE:   Pt states the (L) calf feels much looser than it had. Pt notes in general pain severity has lessened. However, pt does note an achy pain in the central part of the calf that is a very similar pain. This is less intense, but more frequent since last Thursday. Pt denies any swelling, warmth, or redness. Pt states he has not been compliant with his HEP including his T-Band ankle strengthening and calf strengthening/stretching routine.       OBJECTIVE:  3/8/2022   Observation:Moderate to severe restrictions remain lateral portion of (L) gastroc,   Improved but mild restrictions remain medial portion   Test measurements:     (-) Homans     SLS: > 30 sec (B)    RESTRICTIONS/PRECAUTIONS:  none    Exercises/Interventions:     Therapeutic Ex 20' Resistance Sets/sec Reps Notes   Standing Gastroc/Soleus Stretch IB 30\" 3 ea    Seated hurdler HSS  30\" 3 Stopped  due to reproduction of pain   Seated ankle Eversion/DF blue 2 12 ea 2/23 Held (doing at home)   Standing Heel Raises off step ecc focus 2 12 2/23 Held   Soleus Raise 50lbs 3 12 Added 3/1   Upright Bike 5'   Added 38                                               Therapeutic Activities                                                               Manual Intervention x 15'       Knee mobs/PROM       Tib/Fem Mobs       DN       IASTM (L) Gastroc/Soleus, peroneals 15'                   NMR re-education x 8'       Danny Contreras Working on knee over toes/achilles stretch 3\" 12 3/1   SLS Plyoback on airex 4lb 15 2                                                             Therapeutic Exercise and NMR EXR  [x] (50889) Provided verbal/tactile cueing for activities related to strengthening, flexibility, endurance, ROM for improvements in LE, proximal hip, and core control with self care, mobility, lifting, ambulation.  [] (84070) Provided verbal/tactile cueing for activities related to improving balance, coordination, kinesthetic sense, posture, motor skill, proprioception  to assist with LE, proximal hip, and core control in self care, mobility, lifting, ambulation and eccentric single leg control.      NMR and Therapeutic Activities:    [x] (39167 or 46349) Provided verbal/tactile cueing for activities related to improving balance, coordination, kinesthetic sense, posture, motor skill, proprioception and motor activation to allow for proper function of core, proximal hip and LE with self care and ADLs  [] (57630) Gait Re-education- Provided training and instruction to the patient for proper LE, core and proximal hip recruitment and positioning and eccentric body weight control with ambulation re-education including up and down stairs     Home Exercise Program:    [x] (27891) Reviewed/Progressed HEP activities related to strengthening, flexibility, endurance, ROM of core, proximal hip and LE for functional self-care, mobility, lifting and ambulation/stair navigation   [] (67656)Reviewed/Progressed HEP activities related to improving balance, coordination, kinesthetic sense, posture, motor skill, proprioception of core, proximal hip and LE for self care, mobility, lifting, and ambulation/stair navigation      Manual Treatments:  PROM / STM / Oscillations-Mobs:  G-I, II, III, IV (PA's, Inf., Post.)  [x] (36839) Provided manual therapy to mobilize LE, proximal hip and/or LS spine soft tissue/joints for the purpose of modulating pain, promoting relaxation,  increasing ROM, reducing/eliminating soft tissue swelling/inflammation/restriction, improving soft tissue extensibility and allowing for proper ROM for normal function with self care, mobility, lifting and ambulation. Modalities:      Charges:  Timed Code Treatment Minutes: 37'   Total Treatment Minutes: 48'       [] EVAL (LOW) 22298 (typically 20 minutes face-to-face)  [] EVAL (MOD) 13553 (typically 30 minutes face-to-face)  [] EVAL (HIGH) 57081 (typically 45 minutes face-to-face)  [] RE-EVAL     [x] DE(36365) x 1    [] IONTO (13544)  [x] NMR (71141) x1     [] VASO (99987)  [x] Manual (12063) x1     [x] Other: DN  [] TA (74504)x     [] Mech Traction (84483)  [] ES(attended) (74153)     [] ES (un) (66057): If BWC Please Indicate Time In/Out and Total Minutes  CPT Code Time in Time out Total Min                                           GOALS:  Patient stated goal: be able to Sleep  [] Progressing: [] Met: [] Not Met: [] Adjusted    Therapist goals for Patient:   Short Term Goals: To be achieved in: 2 weeks  1. Independent in HEP and progression per patient tolerance, in order to prevent re-injury. [] Progressing: [] Met: [] Not Met: [] Adjusted  2. Patient will have a decrease in pain to facilitate improvement in movement, function, and ADLs as indicated by Functional Deficits. [] Progressing: [] Met: [] Not Met: [] Adjusted    Long Term Goals:  To be achieved in: 4 weeks  1 Patient will demonstrate symmetrical gastroc flexibility (B) LE with no pain during testing in order to demonstrate functional improvement for daily and work activities. [] Progressing: [] Met: [] Not Met: [] Adjusted  2. Patient will be able to sleep for 6 hours or more without disruption due to the calf for adequate rest at night. [] Progressing: [] Met: [] Not Met: [] Adjusted  4. Patient will demonstrate a 50% or > reduction in (L) calf pain at rest in order to return to prior level of function. [] Progressing: [] Met: [] Not Met: [] Adjusted         Progression Towards Functional goals:  [x] Patient is progressing as expected towards functional goals listed. [] Progression is slowed due to complexities listed. [] Progression has been slowed due to co-morbidities. [] Plan just implemented, too soon to assess goals progression  [] Other:     ASSESSMENT:  Held on DN this date due to reported achiness in the central calf region this date on (L). No swelling, warmth, or other issues noted and Zara's remains negative. Pt noted resolution of pain following IASTM this date and was painfree upon leaving the clinic this date. Severe restrictions in the lateral portion of the (L) gastroc remain, very close to pt's point of pain. Overall calf flexibility has improved with minimal restrictions noted medially. Pt states he has not been compliant with his HEP from the start due to forgetting and was re-educated on the importance of being compliant with his HEP in order to help maximize the benefit of therapy and to help with carry-over of improvement.       Return to Play: (if applicable)   []  Stage 1: Intro to Strength   []  Stage 2: Return to Run and Strength   []  Stage 3: Return to Jump and Strength   []  Stage 4: Dynamic Strength and Agility   []  Stage 5: Sport Specific Training     []  Ready to Return to Play, Meets All Above Stages   []  Not Ready for Return to Sports   Comments: Treatment/Activity Tolerance:  [x] Patient tolerated treatment well [] Patient limited by fatique  [] Patient limited by pain  [] Patient limited by other medical complications  [] Other:     Overall Progression Towards Functional goals/ Treatment Progress Update:  [x] Patient is progressing as expected towards functional goals listed. [] Progression is slowed due to complexities/Impairments listed. [] Progression has been slowed due to co-morbidities. [] Plan just implemented, too soon to assess goals progression <30days   [] Goals require adjustment due to lack of progress  [] Patient is not progressing as expected and requires additional follow up with physician  [] Other    Prognosis for POC: [x] Good [] Fair  [] Poor    Patient requires continued skilled intervention: [x] Yes  [] No        PLAN: 1x/wk as needed  [x] Continue per plan of care [] Alter current plan (see comments)  [] Plan of care initiated [] Hold pending MD visit [] Discharge    Electronically signed by: Lenny Yanez PT    Note: If patient does not return for scheduled/recommended follow up visits, this note will serve as a discharge from care along with the most recent update on progress.

## 2022-03-11 ENCOUNTER — HOSPITAL ENCOUNTER (OUTPATIENT)
Dept: PHYSICAL THERAPY | Age: 52
Setting detail: THERAPIES SERIES
Discharge: HOME | End: 2022-03-11
Payer: COMMERCIAL

## 2022-03-11 PROCEDURE — 97112 NEUROMUSCULAR REEDUCATION: CPT

## 2022-03-11 PROCEDURE — 97110 THERAPEUTIC EXERCISES: CPT

## 2022-03-11 NOTE — FLOWSHEET NOTE
[facility]    Physical Therapy Treatment Note/ Progress Report:     Date:  3/11/2022    Patient Name:  Angelica Aguilera    :  1970  MRN: [id number]  Medical/Treatment Diagnosis Information:  Diagnosis: Muscle Pain (L) M79.18   S82.112S Gastroc Strain (L), Sequela  Treatment Diagnosis: (L) LE Pain. Soft Tissue Dysfunction (L) Calf musculature  Insurance/Certification information:  PT Insurance Information: [facility]  Physician Information:  Referring Practitioner: Dr. Khoa Pitts of care signed (Y/N): Y    Date of Patient follow up with Physician:      Progress Report: []  Yes  [x]  No     Functional Scale: LEFS 0% LOF   Date:2022    Date Range for reporting period:  Beginnin2022  Ending:  3/18/2022    Progress report due (10 Rx/or 30 days whichever is less):      Recertification due (POC duration/ or 90 days whichever is less): 3/18/2022    Visit # Insurance Allowable Auth Needed   4 60 PCY []Yes    []No     Pain level:  0/10 currently  5/10 at worst    SUBJECTIVE:    Pt states feeling his pain is back to his baseline/normal pain. Pt states he woke up Tuesday night with pain and it continues to be intermittent. Pt states he has realized that when he sleeps in bed or in the recliner he is actively keeping his foot/ankle in plantarflexion. Pt states he has done this for a long time. Pt continues to feel mainly painfree.       OBJECTIVE:  3/11/2022   Observation:Moderate to severe restrictions remain lateral portion of (L) gastroc,   Improved but mild restrictions remain medial portion   Test measurements:     (-) Homans     Calf Flexiblity: Knee extended DF: 10 deg (B)  Dorsiflexion AROM: WNL (B)    SLS: > 30 sec (B)    RESTRICTIONS/PRECAUTIONS:  none    Exercises/Interventions:     Therapeutic Ex 24' Resistance Sets/sec Reps Notes   Standing Gastroc/Soleus Stretch IB 30\" 3 ea    Seated hurdler HSS  30\" 3 Stopped  due to reproduction of pain   Seated ankle Eversion/DF blue 2 12 ea 2/23 Held (doing at home)   Upright Bike 5'   Added 3/8   Forward Step up Alt for first set and then (L) side eccentric only for second 2 10 ea Added 3/11 pt noted discomfort with push off portion, but no problem with step down                                        Therapeutic Activities                                                               Manual Intervention x        Knee mobs/PROM       Tib/Fem Mobs       DN       IASTM                   NMR re-education x 18'       Danielu Diane Working on knee over toes/achilles stretch 3\" 12 3/1   SLS Plyoback on airex 4lb 15 2    Tiltboard A/P 3 20 3/11          Pt education on sleep posture and sitting posture including use of pillows, relaxing foot/ankle to natural position/avoidance of active plantarflexion 5'                                          Therapeutic Exercise and NMR EXR  [x] (80604) Provided verbal/tactile cueing for activities related to strengthening, flexibility, endurance, ROM for improvements in LE, proximal hip, and core control with self care, mobility, lifting, ambulation.  [] (64185) Provided verbal/tactile cueing for activities related to improving balance, coordination, kinesthetic sense, posture, motor skill, proprioception  to assist with LE, proximal hip, and core control in self care, mobility, lifting, ambulation and eccentric single leg control.      NMR and Therapeutic Activities:    [x] (07869 or 20925) Provided verbal/tactile cueing for activities related to improving balance, coordination, kinesthetic sense, posture, motor skill, proprioception and motor activation to allow for proper function of core, proximal hip and LE with self care and ADLs  [] (33064) Gait Re-education- Provided training and instruction to the patient for proper LE, core and proximal hip recruitment and positioning and eccentric body weight control with ambulation re-education including up and down stairs     Home Exercise Program:    [x] (95359) Reviewed/Progressed HEP activities related to strengthening, flexibility, endurance, ROM of core, proximal hip and LE for functional self-care, mobility, lifting and ambulation/stair navigation   [] (01114)Reviewed/Progressed HEP activities related to improving balance, coordination, kinesthetic sense, posture, motor skill, proprioception of core, proximal hip and LE for self care, mobility, lifting, and ambulation/stair navigation      Manual Treatments:  PROM / STM / Oscillations-Mobs:  G-I, II, III, IV (PA's, Inf., Post.)  [x] (73798) Provided manual therapy to mobilize LE, proximal hip and/or LS spine soft tissue/joints for the purpose of modulating pain, promoting relaxation,  increasing ROM, reducing/eliminating soft tissue swelling/inflammation/restriction, improving soft tissue extensibility and allowing for proper ROM for normal function with self care, mobility, lifting and ambulation. Modalities:      Charges:  Timed Code Treatment Minutes: 43'   Total Treatment Minutes: 55'       [] EVAL (LOW) 06332 (typically 20 minutes face-to-face)  [] EVAL (MOD) 17210 (typically 30 minutes face-to-face)  [] EVAL (HIGH) 28552 (typically 45 minutes face-to-face)  [] RE-EVAL     [x] FX(42123) x 2    [] IONTO (40556)  [x] NMR (08272) x1     [] VASO (83177)  [] Manual (46589) x1     [x] Other: DN  [] TA (84601)x     [] Mech Traction (90173)  [] ES(attended) (34977)     [] ES (un) (97067): If BW Please Indicate Time In/Out and Total Minutes  CPT Code Time in Time out Total Min                                           GOALS:  Patient stated goal: be able to Sleep  [] Progressing: [] Met: [] Not Met: [] Adjusted    Therapist goals for Patient:   Short Term Goals: To be achieved in: 2 weeks  1. Independent in HEP and progression per patient tolerance, in order to prevent re-injury. [] Progressing: [] Met: [] Not Met: [] Adjusted  2.  Patient will have a decrease in pain to facilitate improvement in movement, function, and ADLs as indicated by Functional Deficits. [] Progressing: [] Met: [] Not Met: [] Adjusted    Long Term Goals: To be achieved in: 4 weeks  1  Patient will demonstrate symmetrical gastroc flexibility (B) LE with no pain during testing in order to demonstrate functional improvement for daily and work activities. [] Progressing: [] Met: [] Not Met: [] Adjusted  2. Patient will be able to sleep for 6 hours or more without disruption due to the calf for adequate rest at night. [] Progressing: [] Met: [] Not Met: [] Adjusted  4. Patient will demonstrate a 50% or > reduction in (L) calf pain at rest in order to return to prior level of function. [] Progressing: [] Met: [] Not Met: [] Adjusted         Progression Towards Functional goals:  [x] Patient is progressing as expected towards functional goals listed. [] Progression is slowed due to complexities listed. [] Progression has been slowed due to co-morbidities. [] Plan just implemented, too soon to assess goals progression  [] Other:     ASSESSMENT:  Held DN/manual today as significantly improved soft tissue mobility of the (L) calf region as well as normal calf flexibility. Pt reported today that he generally relaxes/sleeps with his foot in active plantarflexion. This is likely at least part of pt's discomfort which is still only really occurring at night or when he is in his recliner. Pt continues to generally perform all normal daily activities without significant pain or limitation. Emphasized the importance of correcting this positioning at night due to the resulting over-activation of the gastroc/soleus complex for prolonged periods. Reviewed proper sleeping posture as noted above. Due to irritability with any push off/concentric plantarflexion activity at this time, held on heel raises and soleus raises both in clinic and will at home as well.   Pt      Return to Play: (if applicable)   [] Stage 1: Intro to Strength   []  Stage 2: Return to Run and Strength   []  Stage 3: Return to Jump and Strength   []  Stage 4: Dynamic Strength and Agility   []  Stage 5: Sport Specific Training     []  Ready to Return to Play, Meets All Above Stages   []  Not Ready for Return to Sports   Comments:            Treatment/Activity Tolerance:  [x] Patient tolerated treatment well [] Patient limited by fatique  [] Patient limited by pain  [] Patient limited by other medical complications  [] Other:     Overall Progression Towards Functional goals/ Treatment Progress Update:  [x] Patient is progressing as expected towards functional goals listed. [] Progression is slowed due to complexities/Impairments listed. [] Progression has been slowed due to co-morbidities. [] Plan just implemented, too soon to assess goals progression <30days   [] Goals require adjustment due to lack of progress  [] Patient is not progressing as expected and requires additional follow up with physician  [] Other    Prognosis for POC: [x] Good [] Fair  [] Poor    Patient requires continued skilled intervention: [x] Yes  [] No        PLAN: Re-assess NPV,  Discuss posture  [x] Continue per plan of care [] Alter current plan (see comments)  [] Plan of care initiated [] Hold pending MD visit [] Discharge    Electronically signed by: Samson Correia PT    Note: If patient does not return for scheduled/recommended follow up visits, this note will serve as a discharge from care along with the most recent update on progress.

## 2022-03-15 ENCOUNTER — HOSPITAL ENCOUNTER (OUTPATIENT)
Dept: PHYSICAL THERAPY | Age: 52
Setting detail: THERAPIES SERIES
Discharge: HOME | End: 2022-03-15
Payer: COMMERCIAL

## 2022-03-15 PROCEDURE — 97140 MANUAL THERAPY 1/> REGIONS: CPT

## 2022-03-15 PROCEDURE — 97110 THERAPEUTIC EXERCISES: CPT

## 2022-03-15 NOTE — FLOWSHEET NOTE
[facility]    Physical Therapy Treatment Note/ Progress Report:     Date:  3/15/2022    Patient Name:  Holli Burleson    :  1970  MRN: [id number]  Medical/Treatment Diagnosis Information:  Diagnosis: Muscle Pain (L) M79.18   S82.112S Gastroc Strain (L), Sequela  Treatment Diagnosis: (L) LE Pain. Soft Tissue Dysfunction (L) Calf musculature  Insurance/Certification information:  PT Insurance Information: [facility]  Physician Information:  Referring Practitioner: Dr. Zimmer Gave of care signed (Y/N): Y    Date of Patient follow up with Physician:      Progress Report: []  Yes  [x]  No     Functional Scale: LEFS 0% LOF   Date:2022    Date Range for reporting period:  Beginnin2022  Ending:  3/18/2022    Progress report due (10 Rx/or 30 days whichever is less):      Recertification due (POC duration/ or 90 days whichever is less): 3/18/2022    Visit # Insurance Allowable Auth Needed   5 60 PCY []Yes    []No     Pain level:  0/10 currently  2/10 at worst    SUBJECTIVE:     Pt states he has been working on not actively plantarflexing when sleeping or resting, but that he has been actively dorsiflexing as a result. Pt notes his \"ligaments\" are sore but that his (L) calf is feeling better since last visit. Pt states he has been sleeping better.      OBJECTIVE:  3/14/2022   Observation:Moderate to severe restrictions remain lateral portion of (L) gastroc,   I   Test measurements:     (-) Homans     Calf Flexiblity: Knee extended DF: 10 deg (B)  Hamstring Flexibility 90/90: Mod ea side  Dorsiflexion AROM: WNL (B)    SLS: > 30 sec (B)    SLR: symmetrical (B)   Muscle stretch, not true pain    AROM Lumbar spine: WFL and painfree in all planes    RESTRICTIONS/PRECAUTIONS:  none    Exercises/Interventions:     Therapeutic Ex 26' Resistance Sets/sec Reps Notes   Standing Gastroc/Soleus Stretch IB 30\" 3 ea    Seated hurdler HSS  30\" 3 Stopped 2/18 due to reproduction of pain   Seated ankle Eversion/DF blue 2 12 ea 2/23 Held (doing at home)   Upright Bike 5'   Added 3/8   Lateral Stepdown 6\" 2 10 ea Added 3/15 cueing for balance, ecc control   Leg Press: DL   Range 90-10 120lbs 3 10 Added 3/15                           Therapeutic Activities                                                               Manual Intervention x 15'       Knee mobs/PROM       Tib/Fem Mobs       DN       IASTM (L) Gastroc/Soleus, peroneals 15'                   NMR re-education x 5'       [employer] Lunge Working on knee over toes/achilles stretch 3\" 12 3/1   SLS Plyoback on airex 4lb 15 2    Tiltboard A/P and M/L 3 20 3/11                                                     Therapeutic Exercise and NMR EXR  [x] (24611) Provided verbal/tactile cueing for activities related to strengthening, flexibility, endurance, ROM for improvements in LE, proximal hip, and core control with self care, mobility, lifting, ambulation.  [] (47221) Provided verbal/tactile cueing for activities related to improving balance, coordination, kinesthetic sense, posture, motor skill, proprioception  to assist with LE, proximal hip, and core control in self care, mobility, lifting, ambulation and eccentric single leg control.      NMR and Therapeutic Activities:    [x] (06805 or 67608) Provided verbal/tactile cueing for activities related to improving balance, coordination, kinesthetic sense, posture, motor skill, proprioception and motor activation to allow for proper function of core, proximal hip and LE with self care and ADLs  [] (90375) Gait Re-education- Provided training and instruction to the patient for proper LE, core and proximal hip recruitment and positioning and eccentric body weight control with ambulation re-education including up and down stairs     Home Exercise Program:    [x] (15270) Reviewed/Progressed HEP activities related to strengthening, flexibility, endurance, ROM of core, proximal hip and LE for functional self-care, mobility, lifting and ambulation/stair navigation   [] (32160)Reviewed/Progressed HEP activities related to improving balance, coordination, kinesthetic sense, posture, motor skill, proprioception of core, proximal hip and LE for self care, mobility, lifting, and ambulation/stair navigation      Manual Treatments:  PROM / STM / Oscillations-Mobs:  G-I, II, III, IV (PA's, Inf., Post.)  [x] (04605) Provided manual therapy to mobilize LE, proximal hip and/or LS spine soft tissue/joints for the purpose of modulating pain, promoting relaxation,  increasing ROM, reducing/eliminating soft tissue swelling/inflammation/restriction, improving soft tissue extensibility and allowing for proper ROM for normal function with self care, mobility, lifting and ambulation. Modalities:      Charges:  Timed Code Treatment Minutes: 55'   Total Treatment Minutes: 55'       [] EVAL (LOW) 61187 (typically 20 minutes face-to-face)  [] EVAL (MOD) 16010 (typically 30 minutes face-to-face)  [] EVAL (HIGH) 81208 (typically 45 minutes face-to-face)  [] RE-EVAL     [x] KO(51185) x 2    [] IONTO (30938)  [] NMR (37228) x1     [] VASO (83184)  [x] Manual (38811) x1     [x] Other: DN  [] TA (54658)x     [] Mech Traction (09015)  [] ES(attended) (02117)     [] ES (un) (82481): If BWC Please Indicate Time In/Out and Total Minutes  CPT Code Time in Time out Total Min                                           GOALS:  Patient stated goal: be able to Sleep  [] Progressing: [] Met: [] Not Met: [] Adjusted    Therapist goals for Patient:   Short Term Goals: To be achieved in: 2 weeks  1. Independent in HEP and progression per patient tolerance, in order to prevent re-injury. [] Progressing: [] Met: [] Not Met: [] Adjusted  2. Patient will have a decrease in pain to facilitate improvement in movement, function, and ADLs as indicated by Functional Deficits.   [] Progressing: [] Met: [] Not Met: [] Adjusted    Long Term Goals: To be achieved in: 4 weeks  1  Patient will demonstrate symmetrical gastroc flexibility (B) LE with no pain during testing in order to demonstrate functional improvement for daily and work activities. [] Progressing: [] Met: [] Not Met: [] Adjusted  2. Patient will be able to sleep for 6 hours or more without disruption due to the calf for adequate rest at night. [] Progressing: [] Met: [] Not Met: [] Adjusted  4. Patient will demonstrate a 50% or > reduction in (L) calf pain at rest in order to return to prior level of function. [] Progressing: [] Met: [] Not Met: [] Adjusted         Progression Towards Functional goals:  [x] Patient is progressing as expected towards functional goals listed. [] Progression is slowed due to complexities listed. [] Progression has been slowed due to co-morbidities. [] Plan just implemented, too soon to assess goals progression  [] Other:     ASSESSMENT:  Pt has demonstrate significant progress in pain since last visit. However, pt states he has now been actively holding his (L) foot/ankle in dorisflexion. Pt re-educated that he should let his foot/ankle rest in it's natural position to help minimize any unnecessary soreness in both his calf or tibialis anterior. No issues throughout session today. Significant flexibility deficits remain (B) hamstrings as well as tissue restrictions (L) lateral portion of gastroc. It is likely that continued activity modification, specifically allowing his foot/ankle to rest when sleeping/relaxing, will result in further symptom relief.     Return to Play: (if applicable)   []  Stage 1: Intro to Strength   []  Stage 2: Return to Run and Strength   []  Stage 3: Return to Jump and Strength   []  Stage 4: Dynamic Strength and Agility   []  Stage 5: Sport Specific Training     []  Ready to Return to Play, Meets All Above Stages   []  Not Ready for Return to Sports   Comments:            Treatment/Activity Tolerance:  [x] Patient tolerated treatment well [] Patient limited by fatique  [] Patient limited by pain  [] Patient limited by other medical complications  [] Other:     Overall Progression Towards Functional goals/ Treatment Progress Update:  [x] Patient is progressing as expected towards functional goals listed. [] Progression is slowed due to complexities/Impairments listed. [] Progression has been slowed due to co-morbidities. [] Plan just implemented, too soon to assess goals progression <30days   [] Goals require adjustment due to lack of progress  [] Patient is not progressing as expected and requires additional follow up with physician  [] Other    Prognosis for POC: [x] Good [] Fair  [] Poor    Patient requires continued skilled intervention: [x] Yes  [] No        PLAN: Re-assess NPV,  Discuss posture  [x] Continue per plan of care [] Alter current plan (see comments)  [] Plan of care initiated [] Hold pending MD visit [] Discharge    Electronically signed by: Amna Johnson PT    Note: If patient does not return for scheduled/recommended follow up visits, this note will serve as a discharge from care along with the most recent update on progress.

## 2022-03-23 ENCOUNTER — HOSPITAL ENCOUNTER (OUTPATIENT)
Dept: PHYSICAL THERAPY | Age: 52
Setting detail: THERAPIES SERIES
Discharge: HOME | End: 2022-03-23
Payer: COMMERCIAL

## 2022-03-23 PROCEDURE — 97110 THERAPEUTIC EXERCISES: CPT

## 2022-03-23 PROCEDURE — 20560 NDL INSJ W/O NJX 1 OR 2 MUSC: CPT

## 2022-03-23 PROCEDURE — 97140 MANUAL THERAPY 1/> REGIONS: CPT

## 2022-03-23 NOTE — FLOWSHEET NOTE
[facility]    Physical Therapy Treatment Note/ Progress Report:     Date:  3/23/2022    Patient Name:  Yohannes Dueñas    :  1970  MRN: [id number]  Medical/Treatment Diagnosis Information:  Diagnosis: Muscle Pain (L) M79.18   S82.112S Gastroc Strain (L), Sequela  Treatment Diagnosis: (L) LE Pain. Soft Tissue Dysfunction (L) Calf musculature  Insurance/Certification information:  PT Insurance Information: [facility]  Physician Information:  Referring Practitioner: Dr. Madeline Babcock of care signed (Y/N): Y    Date of Patient follow up with Physician:      Progress Report: []  Yes  [x]  No     Functional Scale: LEFS 0% LOF   Date:2022    Date Range for reporting period:  Beginnin2022  Ending:  3/18/2022    Progress report due (10 Rx/or 30 days whichever is less):      Recertification due (POC duration/ or 90 days whichever is less): 3/18/2022    Visit # Insurance Allowable Auth Needed   6 60 PCY []Yes    []No     Pain level:  0/10 currently  2/10 at worst    SUBJECTIVE:     Pt states he flew to [place] last Thursday. Pt notes he had significant pain Thursday and Friday night. Pt notes he did just fine Saturday, , and Monday night. Pt states he has been more aware to minimize active plantarflexion. Pt feels his symptoms are a little less frequent, but symptoms are just as intense from when therapy was initiated.     OBJECTIVE:  3/23/2022   Observation:Moderate to severe restrictions remain lateral portion of (L) gastroc,   I   Test measurements:     (-) Homans     Calf Flexiblity: Knee extended Mild deficits (L)  Hamstring Flexibility 90/90: Mod ea side  Dorsiflexion AROM: WNL (B)    SLS: > 30 sec (B)    SLR: symmetrical (B)   Muscle stretch, not true pain    AROM Lumbar spine: WFL and painfree in all planes    No symptoms reproduced with palpation along (L) hamstrings, piriformis/sciatic nerve    Significant trigger points and tightness palpable along medial and lateral portion of (L) gastroc. RESTRICTIONS/PRECAUTIONS:  none    Exercises/Interventions:     Therapeutic Ex 10' Resistance Sets/sec Reps Notes   Standing Gastroc/Soleus Stretch IB 30\" 3 ea    Seated hurdler HSS  30\" 3 Stopped 2/18 due to reproduction of pain   Seated ankle Eversion/DF blue 2 12 ea 2/23 Held (doing at home)   Upright Bike 3   Added 3/8   Lateral Stepdown 6\" 2 10 ea Added 3/15 cueing for balance, ecc control   Leg Press: DL   Range 90-10 120lbs 3 10 Added 3/15                           Therapeutic Activities                                                               Manual Intervention x 24'       Knee mobs/PROM       Nerve Glides 6'      DN 6'      IASTM (L) Gastroc/Soleus, peroneals 12'                   NMR re-education        Danny Contreras Working on knee over toes/achilles stretch 3\" 12 3/1   SLS Plyoback on airex 4lb 15 2    Tiltboard A/P and M/L 3 20 3/11                                                 Spoke with   regarding the use of Dry Needling     Dry needling manual therapy: consisted on the placement of 6 needles in the following muscles:  (L) gastroc/soleus. A 40 mm needle was inserted, piston, rotated, and coned to produce intramuscular mobilization. These techniques were used to restore functional range of motion, reduce muscle spasm and induce healing in the corresponding musculature. (88669)  Clean Technique was utilized today while applying Dry needling treatment. The treatment sites where cleaned with 70% solution of  isopropyl alcohol . The PT washed their hands and utilized treatment gloves along with hand  prior to inserting the needles. All needles where removed and discarded in the appropriate sharps container. MD has given verbal and/or written approval for this treatment.      Therapeutic Exercise and NMR EXR  [x] (41776) Provided verbal/tactile cueing for activities related to strengthening, flexibility, endurance, ROM for improvements in LE, proximal hip, and core control with self care, mobility, lifting, ambulation.  [] (38592) Provided verbal/tactile cueing for activities related to improving balance, coordination, kinesthetic sense, posture, motor skill, proprioception  to assist with LE, proximal hip, and core control in self care, mobility, lifting, ambulation and eccentric single leg control. NMR and Therapeutic Activities:    [x] (35891 or 84351) Provided verbal/tactile cueing for activities related to improving balance, coordination, kinesthetic sense, posture, motor skill, proprioception and motor activation to allow for proper function of core, proximal hip and LE with self care and ADLs  [] (08116) Gait Re-education- Provided training and instruction to the patient for proper LE, core and proximal hip recruitment and positioning and eccentric body weight control with ambulation re-education including up and down stairs     Home Exercise Program:    [x] (92371) Reviewed/Progressed HEP activities related to strengthening, flexibility, endurance, ROM of core, proximal hip and LE for functional self-care, mobility, lifting and ambulation/stair navigation   [] (23173)Reviewed/Progressed HEP activities related to improving balance, coordination, kinesthetic sense, posture, motor skill, proprioception of core, proximal hip and LE for self care, mobility, lifting, and ambulation/stair navigation      Manual Treatments:  PROM / STM / Oscillations-Mobs:  G-I, II, III, IV (PA's, Inf., Post.)  [x] (74249) Provided manual therapy to mobilize LE, proximal hip and/or LS spine soft tissue/joints for the purpose of modulating pain, promoting relaxation,  increasing ROM, reducing/eliminating soft tissue swelling/inflammation/restriction, improving soft tissue extensibility and allowing for proper ROM for normal function with self care, mobility, lifting and ambulation.      Modalities: Charges:  Timed Code Treatment Minutes: 34'   Total Treatment Minutes: 36'       [] EVAL (LOW) 455 [id number] (typically 20 minutes face-to-face)  [] EVAL (MOD) 73184 (typically 30 minutes face-to-face)  [] EVAL (HIGH) 07232 (typically 45 minutes face-to-face)  [] RE-EVAL     [x] BZ(20033) x 1   [] IONTO (00723)  [] NMR (21261) x1     [] VASO (52611)  [x] Manual (08021) x1     [x] Other: DN  [] TA (72453)x     [] Mech Traction (97100)  [] ES(attended) (81169)     [] ES (un) (06410): If BWC Please Indicate Time In/Out and Total Minutes  CPT Code Time in Time out Total Min                                           GOALS:  Patient stated goal: be able to Sleep  [] Progressing: [] Met: [] Not Met: [] Adjusted    Therapist goals for Patient:   Short Term Goals: To be achieved in: 2 weeks  1. Independent in HEP and progression per patient tolerance, in order to prevent re-injury. [] Progressing: [] Met: [] Not Met: [] Adjusted  2. Patient will have a decrease in pain to facilitate improvement in movement, function, and ADLs as indicated by Functional Deficits. [] Progressing: [] Met: [] Not Met: [] Adjusted    Long Term Goals: To be achieved in: 4 weeks  1  Patient will demonstrate symmetrical gastroc flexibility (B) LE with no pain during testing in order to demonstrate functional improvement for daily and work activities. [] Progressing: [] Met: [] Not Met: [] Adjusted  2. Patient will be able to sleep for 6 hours or more without disruption due to the calf for adequate rest at night. [] Progressing: [] Met: [] Not Met: [] Adjusted  4. Patient will demonstrate a 50% or > reduction in (L) calf pain at rest in order to return to prior level of function. [] Progressing: [] Met: [] Not Met: [] Adjusted         Progression Towards Functional goals:  [x] Patient is progressing as expected towards functional goals listed. [] Progression is slowed due to complexities listed.   [] Progression has been slowed due to co-morbidities. [] Plan just implemented, too soon to assess goals progression  [] Other:     ASSESSMENT:  Added back in dry needling this date based on initial reduction in pain with dry needling and lack of recent progress. Pt has had multiple nights with good sleep and in general, symptom frequency is less. However, pt does still have nights where his pain disrupts sleep in a significant way. Due to recent trip, pt did admit that he was unable to perform his exercises which is possibly why pt's (L) gastroc was so much tighter this date. Therapy will re-assess pt's response to re-starting dry needling next week. If no further progress is made within the next 1-2 visits, pt may be appropriate to refer back to M.D.. Return to Play: (if applicable)   []  Stage 1: Intro to Strength   []  Stage 2: Return to Run and Strength   []  Stage 3: Return to Jump and Strength   []  Stage 4: Dynamic Strength and Agility   []  Stage 5: Sport Specific Training     []  Ready to Return to Play, Meets All Above Stages   []  Not Ready for Return to Sports   Comments:            Treatment/Activity Tolerance:  [x] Patient tolerated treatment well [] Patient limited by fatique  [] Patient limited by pain  [] Patient limited by other medical complications  [] Other:     Overall Progression Towards Functional goals/ Treatment Progress Update:  [x] Patient is progressing as expected towards functional goals listed. [] Progression is slowed due to complexities/Impairments listed. [] Progression has been slowed due to co-morbidities.   [] Plan just implemented, too soon to assess goals progression <30days   [] Goals require adjustment due to lack of progress  [] Patient is not progressing as expected and requires additional follow up with physician  [] Other    Prognosis for POC: [x] Good [] Fair  [] Poor    Patient requires continued skilled intervention: [x] Yes  [] No        PLAN: Re-assess NPV,  Determine appropriate to POC  [x] Continue per plan of care [] Alter current plan (see comments)  [] Plan of care initiated [] Hold pending MD visit [] Discharge    Electronically signed by: Jose Valencia PT    Note: If patient does not return for scheduled/recommended follow up visits, this note will serve as a discharge from care along with the most recent update on progress.

## 2022-04-01 ENCOUNTER — HOSPITAL ENCOUNTER (OUTPATIENT)
Dept: PHYSICAL THERAPY | Age: 52
Setting detail: THERAPIES SERIES
Discharge: HOME | End: 2022-04-01

## 2022-04-01 NOTE — PROGRESS NOTES
Physical Therapy      [place]    Physical Therapy  Cancellation/No-show Note  Patient Name:  Carlene Lam  :  1970   Date:  2022  Cancelled visits to date: 1  No-shows to date: 0    For today's appointment patient:  [x]  Cancelled  []  Rescheduled appointment  []  No-show     Reason given by patient:  []  Patient ill  []  Conflicting appointment  []  No transportation    []  Conflict with work  []  No reason given  [x]  Other:  Pt arrived for his appointment and reports his symptoms continue. Pt notes he continues to mainly be limited at night with sleep disrupted. Due to lack of prolonged improvement here in therapy, the pt has been referred back to his referring provider for further assessment. Therapy will be held pending his M.D. appointment. Pt is in agreement with this plan. Comments:      Phone call information:   []  Phone call made today to patient at _ time at number provided:      []  Patient answered, conversation as follows:    []  Patient did not answer, message left as follows:  []  Phone call not made today  [x]  Phone call not needed - pt contacted us to cancel and provided reason for cancellation.      Electronically signed by:  Abdiel Villegas, PT, PT

## 2022-04-05 ENCOUNTER — OFFICE VISIT (OUTPATIENT)
Dept: ORTHOPEDIC SURGERY | Age: 52
End: 2022-04-05
Payer: COMMERCIAL

## 2022-04-05 VITALS — HEIGHT: 70 IN | WEIGHT: 212.96 LBS | BODY MASS INDEX: 30.49 KG/M2

## 2022-04-05 DIAGNOSIS — M71.22 POPLITEAL SYNOVIAL CYST, LEFT: Primary | ICD-10-CM

## 2022-04-05 PROCEDURE — 3017F COLORECTAL CA SCREEN DOC REV: CPT | Performed by: INTERNAL MEDICINE

## 2022-04-05 PROCEDURE — 99213 OFFICE O/P EST LOW 20 MIN: CPT | Performed by: INTERNAL MEDICINE

## 2022-04-05 PROCEDURE — 20612 ASPIRATE/INJ GANGLION CYST: CPT | Performed by: INTERNAL MEDICINE

## 2022-04-05 PROCEDURE — G8427 DOCREV CUR MEDS BY ELIG CLIN: HCPCS | Performed by: INTERNAL MEDICINE

## 2022-04-05 PROCEDURE — G8417 CALC BMI ABV UP PARAM F/U: HCPCS | Performed by: INTERNAL MEDICINE

## 2022-04-05 PROCEDURE — 1036F TOBACCO NON-USER: CPT | Performed by: INTERNAL MEDICINE

## 2022-04-05 NOTE — PROGRESS NOTES
Chief Complaint:   Chief Complaint   Patient presents with    Knee Pain     left, pain persists and has worsened past 2 wks, wakes me every night, wondering what to do next          History of Present Illness:       Patient is a 49 y.o. male returns follow up for the above complaint. The patient was last seen approximately 2 monthsago. The symptoms show no change since the last visit. The patient has had no further testing for the problem. Unfortunately his symptoms remain problematic and typically most notable with the knee extended and worsened by active dorsiflexion of the ankle. Pain localizes to the proximal medial calf region primarily in the popliteal space. Previous work-up is included MRI which again was referenced below    He would like to consider other treatment options we have previously entertained ultrasound-guided aspiration and injection     Past Medical History:        Past Medical History:   Diagnosis Date    Asthma     Mitral regurgitation 2007    PUD (peptic ulcer disease) 1983        Present Medications:         Current Outpatient Medications   Medication Sig Dispense Refill    cyclobenzaprine (FLEXERIL) 10 MG tablet Take 1 tablet by mouth nightly as needed for Muscle spasms 30 tablet 1    Probiotic Product (PROBIOTIC-10 PO) Take by mouth      Multiple Vitamins-Minerals (THERAPEUTIC MULTIVITAMIN-MINERALS) tablet Take 1 tablet by mouth daily. No current facility-administered medications for this visit. Allergies:      No Known Allergies        Review of Systems:    Pertinent items are noted in HPI   . Vital Signs: There were no vitals filed for this visit.      General Exam:     Constitutional: Patient is adequately groomed with no evidence of malnutrition    Physical Exam: left knee      Primary Exam:    Inspection: No deformity atrophy appreciable effusion      Palpation: There is no focal tenderness to palpation in the popliteal space      Range of Motion: Full range and symmetric at the knee      Strength: Normal with SLR      Special Tests: No pain with resisted plantarflexion but low-grade discomfort with passive dorsiflexion at the ankle localizing to the popliteal space      Skin: There are no rashes, ulcerations or lesions. Gait: Nonantalgic    Neurovascular - non focal and intact       Additional Comments:        Additional Examinations:                  Office Imaging Results/Procedures PerformedToday:          Office Procedures:     Orders Placed This Encounter   Procedures    US GUIDED NEEDLE PLACEMENT     Standing Status:   Future     Standing Expiration Date:   4/5/2023     Order Specific Question:   Reason for exam:     Answer:   cyst asp/inj    US EXTREMITY LEFT NON VASC LIMITED     Standing Status:   Future     Standing Expiration Date:   4/5/2023     Order Specific Question:   Reason for exam:     Answer:   dx        Limited ultrasound evaluation-left knee  Logiq E ultrasound 5 MHz    Patient position prone on examination table in the posterior aspect of the knee was evaluated in the superior distribution of the popliteal fossa. The popliteal synovial cyst that was characterized with MRI again was visualized and in short axis measured 2.17 cm posterior to anterior and 2.25 cm proximal to distal.    The cyst was compressible. The popliteal artery and vein were positioned just lateral to the popliteal cyst.  There was no aneurysmal change. The cyst was positioned just posterior to the origin of the origin of medial gastrocnemius tendon. No other soft tissue or osseous abnormalities appreciated. There was no visible stalk emanating from the posterior capsule of the knee. Images stored        Ultrasound-guided aspiration popliteal synovial cyst-left  Logiq ultrasound 10 MHz    The linear transducer was again placed in short axis over the central aspect of the cyst that was characterized above. Sterile technique was performed.     Under direct guidance 25-gauge needle was advanced subcutaneously and intramuscularly within the needle toward the cyst from the lateral approach. Approximately 4 cc 1% lidocaine was utilized. A 20-gauge needle was advanced within the same needle tract and advanced within the central aspect of the cyst under direct guidance. An additional 1 cc of 0.25% Marcaine was utilized in the stage. Using exchange of syringe technique the cyst was then aspirated and visualized to decompress. Approximately 7 cc of pale yellow viscous aspirate was aspirated. Using exchange of syringe technique 1 cc of Celestone and 2 cc of 0.25% Marcaine was injected within the cyst.    The patient tolerated this with negligible discomfort    Limited ultrasound evaluation was performed     Images stored      Other Outside Imaging and Testing Personally Reviewed:      Narrative   Site: [facility] #: [id number] #: [id number] Procedure: MR Left Knee w/o Contrast ; Reason for Exam: Dx, medial meniscus tear, ganglion cyst    This document is confidential medical information.  Unauthorized disclosure or use of this information is prohibited by law. If you are not the intended recipient of this document, please advise us by calling immediately [phone].       [facility] [place] [address] [address]           Patient Name: Antoinette Kvein   Case ID: [id number]   Patient : 1970   Referring Physician: Ambika Daly MD   Exam Date: 02/10/2022   Exam Description: MR Left Knee w/o Contrast            HISTORY:   Patient diagnosed with medial meniscus tear, ganglion cyst.       TECHNICAL FACTORS:  Long- and short-axis fat- and water-weighted images were performed.       COMPARISON:  None.       FINDINGS:   The body and posterior horn medial meniscus are slightly diminutive, with a    horizontal intermediate signal tear at body/posterior horn junction.  Medial tibiofemoral    cartilage is maintained.  Lateral meniscus and lateral tibiofemoral cartilage are preserved.     Patellofemoral cartilage is maintained.       The ACL is intact but hyperintense with small cyst proximally.  Heterogeneous 1.6 by 1.5 x 1.2    cm ovoid mass anterior to distal ACL is concerning for cyclops lesion.  Small cysts are seen in    the tibial eminence at ACL insertion.  PCL is intact.  Medial and lateral collateral ligament    complex structures are intact.  Muscles and tendons are normal throughout the knee.       Trace effusion is seen, with retractile fibrotic appearance of Hoffa's fat.  A Baker's cyst is    seen with large locule superior to medial head gastrocnemius origin.  Neurovascular structures    are intact.       CONCLUSION:   1. Diminutive body and posterior and medial meniscus suggest prior debridement, with    intermediate signal horizontal tear body/posterior horn junction. 2. Hyperintense ACL, sprain versus mucoid degeneration, with proximal intrasubstance cyst as    well as ganglion cysts in tibial eminence at insertion. 3. Mass concerning for cyclops lesion anterior to distal ACL.  Fibrotic, retractile Hoffa's fat    pad. 4. Baker's cyst with large locules superior to medial head gastrocnemius origin.  Trace    effusion.       Thank you for the opportunity to provide your interpretation.               Berenice Mari MD PHD       A: MS 02/11/2022 9:26 AM                 Assessment   Impression: . Encounter Diagnosis   Name Primary?  Popliteal synovial cyst, left Yes        Far proximal popliteal synovial cyst interposed between semimembranosus muscle and origin of the medial gastrocnemius      Plan:        Ace wrap compression and activity modification avoidance of impact activities for the next 24 hours  Courtesy call in 1 week to assess his status  Consider surveillance ultrasound evaluation of the cyst as needed             Orders:        Orders Placed This Encounter   Procedures   Franca PLACEMENT     Standing Status:   Future     Standing Expiration Date:   4/5/2023     Order Specific Question:   Reason for exam:     Answer:   cyst asp/inj    US EXTREMITY LEFT NON VASC LIMITED     Standing Status:   Future     Standing Expiration Date:   4/5/2023     Order Specific Question:   Reason for exam:     Answer:   pascale Hdz MD.      Disclaimer: \"This note was dictated with voice recognition software. Though review and correction are routine, we apologize for any errors. \"

## 2022-04-06 RX ORDER — LIDOCAINE HYDROCHLORIDE 10 MG/ML
5 INJECTION, SOLUTION INFILTRATION; PERINEURAL ONCE
Status: COMPLETED | OUTPATIENT
Start: 2022-04-06 | End: 2022-04-06

## 2022-04-06 RX ORDER — BETAMETHASONE SODIUM PHOSPHATE AND BETAMETHASONE ACETATE 3; 3 MG/ML; MG/ML
6 INJECTION, SUSPENSION INTRA-ARTICULAR; INTRALESIONAL; INTRAMUSCULAR; SOFT TISSUE ONCE
Status: COMPLETED | OUTPATIENT
Start: 2022-04-06 | End: 2022-04-06

## 2022-04-06 RX ORDER — BUPIVACAINE HYDROCHLORIDE 2.5 MG/ML
5 INJECTION, SOLUTION INFILTRATION; PERINEURAL ONCE
Status: COMPLETED | OUTPATIENT
Start: 2022-04-06 | End: 2022-04-06

## 2022-04-06 RX ADMIN — LIDOCAINE HYDROCHLORIDE 5 ML: 10 INJECTION, SOLUTION INFILTRATION; PERINEURAL at 14:43

## 2022-04-06 RX ADMIN — BUPIVACAINE HYDROCHLORIDE 12.5 MG: 2.5 INJECTION, SOLUTION INFILTRATION; PERINEURAL at 14:43

## 2022-04-06 RX ADMIN — BETAMETHASONE SODIUM PHOSPHATE AND BETAMETHASONE ACETATE 6 MG: 3; 3 INJECTION, SUSPENSION INTRA-ARTICULAR; INTRALESIONAL; INTRAMUSCULAR; SOFT TISSUE at 14:42

## 2022-04-19 ENCOUNTER — TELEPHONE (OUTPATIENT)
Dept: ORTHOPEDIC SURGERY | Age: 52
End: 2022-04-19

## 2022-04-19 NOTE — TELEPHONE ENCOUNTER
CC to pt to check status since cyst aspiration:    Pt states sore for a couple days between 2 big muscles in calf, but sharp stabbing pains are gone, overall 80% better. Pt states muscle cramps still wake him at night. Recently had colonoscopy, and was told afterwards that legs were twitching throughout colonoscopy. Pt saw a news story about Restless Legs Syndrome and pt thinks he may have that or something similar, and inquires about a possible referral to be evaluated for this. Pt not sched for F/U at this time. Please advise if pt needs to make appt, or if referral to neuro is appropriate.

## 2022-04-20 NOTE — TELEPHONE ENCOUNTER
S/W pt, he states that leg cramps are in bilateral legs, has tried Magnesium supplement in the past with no improvement (gave him recommended dosage in case he wants to try again in the future), and does not want to take the flexeril. He states the flexeril caused him severe drowsiness, even when taking them at bedtime, he was too groggy the next day. When asked if he might like to request an alternative muscle relaxant, pt states that he does not like taking medications, so he declined at this time. Advised to consult PCP if continued concerns for restless leg symptoms. Pt v/u and will F/U prn.

## 2022-04-29 ENCOUNTER — NURSE ONLY (OUTPATIENT)
Dept: FAMILY MEDICINE CLINIC | Age: 52
End: 2022-04-29
Payer: COMMERCIAL

## 2022-04-29 DIAGNOSIS — Z23 NEED FOR VACCINATION: Primary | ICD-10-CM

## 2022-04-29 PROCEDURE — 90471 IMMUNIZATION ADMIN: CPT | Performed by: FAMILY MEDICINE

## 2022-04-29 PROCEDURE — 90750 HZV VACC RECOMBINANT IM: CPT | Performed by: FAMILY MEDICINE

## 2022-05-15 ENCOUNTER — CLINICAL DOCUMENTATION (OUTPATIENT)
Dept: OTHER | Age: 52
End: 2022-05-15

## 2022-06-14 ENCOUNTER — TELEPHONE (OUTPATIENT)
Dept: ORTHOPEDIC SURGERY | Age: 52
End: 2022-06-14

## 2022-08-17 ENCOUNTER — OFFICE VISIT (OUTPATIENT)
Dept: ORTHOPEDIC SURGERY | Age: 52
End: 2022-08-17
Payer: COMMERCIAL

## 2022-08-17 VITALS — HEIGHT: 70 IN | BODY MASS INDEX: 30.49 KG/M2 | WEIGHT: 212.96 LBS

## 2022-08-17 DIAGNOSIS — M71.22 SYNOVIAL CYST OF POPLITEAL SPACE, LEFT: ICD-10-CM

## 2022-08-17 PROCEDURE — G8427 DOCREV CUR MEDS BY ELIG CLIN: HCPCS | Performed by: INTERNAL MEDICINE

## 2022-08-17 PROCEDURE — G8417 CALC BMI ABV UP PARAM F/U: HCPCS | Performed by: INTERNAL MEDICINE

## 2022-08-17 PROCEDURE — 3017F COLORECTAL CA SCREEN DOC REV: CPT | Performed by: INTERNAL MEDICINE

## 2022-08-17 PROCEDURE — 1036F TOBACCO NON-USER: CPT | Performed by: INTERNAL MEDICINE

## 2022-08-17 PROCEDURE — 99214 OFFICE O/P EST MOD 30 MIN: CPT | Performed by: INTERNAL MEDICINE

## 2022-08-17 NOTE — PROGRESS NOTES
Chief Complaint:   Chief Complaint   Patient presents with    Knee Pain     F/U L knee, had aspiration on 04/05/2022 and had about 3 weeks of 100% relief, now the pain is back and feels that his bakers cyst is back. Pain increases at night time when he is laying down to go to sleep. History of Present Illness:       Patient is a 49 y.o. male returns follow up for the above complaint. The patient was last seen approximately 4 monthsago. The symptoms are worsening since the last visit. The patient has had no further testing for the problem. Status post ultrasound-guided aspiration proximal popliteal synovial cyst interposed between semimembranosus muscle and origin of the medial gastrocnemius-4/5/2022    Presents to the proximal calf same quality and character as he has previously experienced. The symptoms are positional in nature predictable with lying especially at bedtime    He denies any new onset progressive weakness of the lower extremity    Pain levels 8-9/10 at bedtime    He underwent sclerotherapy treatment bilateral lower extremities in the interim and he believes that this is helped his restless leg pain. Past Medical History:        Past Medical History:   Diagnosis Date    Asthma     Mitral regurgitation 2007    PUD (peptic ulcer disease) 1983        Present Medications:         Current Outpatient Medications   Medication Sig Dispense Refill    cyclobenzaprine (FLEXERIL) 10 MG tablet Take 1 tablet by mouth nightly as needed for Muscle spasms 30 tablet 1    Probiotic Product (PROBIOTIC-10 PO) Take by mouth      Multiple Vitamins-Minerals (THERAPEUTIC MULTIVITAMIN-MINERALS) tablet Take 1 tablet by mouth daily. No current facility-administered medications for this visit. Allergies:      No Known Allergies        Review of Systems:    Pertinent items are noted in HPI   10 point review of systems negative except as mentioned in HPI      Vital Signs:     There were no vitals filed for this visit. General Exam:     Constitutional: Patient is adequately groomed with no evidence of malnutrition    Physical Exam: left knee      Primary Exam:    Inspection: No palpable fullness in the popliteal space      Palpation: No focal tenderness to the popliteal space      Range of Motion: Full range and symmetric      Strength: Normal with SLR      Special Tests:   Lachman test negative, collateral ligament stressing stable, anterior/posterior drawer negative, medial and lateral Zackery testing negative, patella femoral provacative Negative      Skin: There are no rashes, ulcerations or lesions. Gait: Antalgic      Neurovascular - non focal and intact       Additional Comments:        Additional Examinations:                          Office Procedures:     Orders Placed This Encounter   Procedures    US EXTREMITY LEFT NON VASC LIMITED     Standing Status:   Future     Number of Occurrences:   1     Standing Expiration Date:   2023     Order Specific Question:   Reason for exam:     Answer:   dx     Limited ultrasound evaluation-left knee  Logiq E ultrasound 8 MHz    Patient position prone on examination table curvilinear transducer was utilized in the proximal popliteal space was evaluated extensively using curvilinear transducer. Again noted was cystic structure positioned within the super gnosis muscle and proximal origin of the medial gastrocnemius consistent with semimembranosus gastrocnemius bursal cyst previously characterized.     This cyst measured 2.46 cm proximal to distal X 1.73 cm posterior to anterior centimeters    Power Doppler imaging negative for any intrinsic hyperemia    Images stored          Other Outside Imaging and Testing Personally Reviewed:       Patient Name: Jazmin Au   Case ID: [id number]   Patient : 1970   Referring Physician: Chico Howe MD   Exam Date: 02/10/2022   Exam Description: MR Left Knee w/o Contrast HISTORY:   Patient diagnosed with medial meniscus tear, ganglion cyst.       TECHNICAL FACTORS:  Long- and short-axis fat- and water-weighted images were performed. COMPARISON:  None. FINDINGS:   The body and posterior horn medial meniscus are slightly diminutive, with a    horizontal intermediate signal tear at body/posterior horn junction. Medial tibiofemoral    cartilage is maintained. Lateral meniscus and lateral tibiofemoral cartilage are preserved. Patellofemoral cartilage is maintained. The ACL is intact but hyperintense with small cyst proximally. Heterogeneous 1.6 by 1.5 x 1.2    cm ovoid mass anterior to distal ACL is concerning for cyclops lesion. Small cysts are seen in    the tibial eminence at ACL insertion. PCL is intact. Medial and lateral collateral ligament    complex structures are intact. Muscles and tendons are normal throughout the knee. Trace effusion is seen, with retractile fibrotic appearance of Hoffa's fat. A Baker's cyst is    seen with large locule superior to medial head gastrocnemius origin. Neurovascular structures    are intact. CONCLUSION:   1. Diminutive body and posterior and medial meniscus suggest prior debridement, with    intermediate signal horizontal tear body/posterior horn junction. 2. Hyperintense ACL, sprain versus mucoid degeneration, with proximal intrasubstance cyst as    well as ganglion cysts in tibial eminence at insertion. 3. Mass concerning for cyclops lesion anterior to distal ACL. Fibrotic, retractile Hoffa's fat    pad. 4. Baker's cyst with large locules superior to medial head gastrocnemius origin. Trace    effusion. Thank you for the opportunity to provide your interpretation. Iveth Sutton MD PHD       A: MS 02/11/2022 9:26 AM                 Assessment   Impression: . Encounter Diagnosis   Name Primary?     Popliteal synovial cyst, left Yes        Status post ultrasound-guided aspiration proximal popliteal synovial cyst interposed between semimembranosus muscle and origin of the medial gastrocnemius-4/5/202      Plan:       Consider repeat ultrasound-guided aspiration of popliteal synovial cyst and injection with steroid versus aspiration and injection with sclerosing agent-doxycycline in suspension or injection with biologic [id number]               Orders:        Orders Placed This Encounter   Procedures    US EXTREMITY LEFT NON VASC LIMITED     Standing Status:   Future     Number of Occurrences:   1     Standing Expiration Date:   8/17/2023     Order Specific Question:   Reason for exam:     Answer:   dx Essie Mortimer, MD.      Disclaimer: \"This note was dictated with voice recognition software. Though review and correction are routine, we apologize for any errors. \"

## 2022-08-24 ENCOUNTER — OFFICE VISIT (OUTPATIENT)
Dept: ORTHOPEDIC SURGERY | Age: 52
End: 2022-08-24
Payer: COMMERCIAL

## 2022-08-24 VITALS — WEIGHT: 212 LBS | BODY MASS INDEX: 30.35 KG/M2 | HEIGHT: 70 IN

## 2022-08-24 DIAGNOSIS — M71.22 POPLITEAL SYNOVIAL CYST, LEFT: ICD-10-CM

## 2022-08-24 DIAGNOSIS — M25.562 POSTERIOR LEFT KNEE PAIN: Primary | ICD-10-CM

## 2022-08-24 PROCEDURE — 20611 DRAIN/INJ JOINT/BURSA W/US: CPT | Performed by: INTERNAL MEDICINE

## 2022-08-24 RX ORDER — LIDOCAINE HYDROCHLORIDE 10 MG/ML
5 INJECTION, SOLUTION INFILTRATION; PERINEURAL ONCE
Status: COMPLETED | OUTPATIENT
Start: 2022-08-24 | End: 2022-08-24

## 2022-08-24 RX ORDER — BETAMETHASONE SODIUM PHOSPHATE AND BETAMETHASONE ACETATE 3; 3 MG/ML; MG/ML
6 INJECTION, SUSPENSION INTRA-ARTICULAR; INTRALESIONAL; INTRAMUSCULAR; SOFT TISSUE ONCE
Status: COMPLETED | OUTPATIENT
Start: 2022-08-24 | End: 2022-08-24

## 2022-08-24 RX ORDER — BUPIVACAINE HYDROCHLORIDE 2.5 MG/ML
3 INJECTION, SOLUTION INFILTRATION; PERINEURAL ONCE
Status: COMPLETED | OUTPATIENT
Start: 2022-08-24 | End: 2022-08-24

## 2022-08-24 RX ADMIN — BETAMETHASONE SODIUM PHOSPHATE AND BETAMETHASONE ACETATE 6 MG: 3; 3 INJECTION, SUSPENSION INTRA-ARTICULAR; INTRALESIONAL; INTRAMUSCULAR; SOFT TISSUE at 17:09

## 2022-08-24 RX ADMIN — BUPIVACAINE HYDROCHLORIDE 7.5 MG: 2.5 INJECTION, SOLUTION INFILTRATION; PERINEURAL at 17:10

## 2022-08-24 RX ADMIN — LIDOCAINE HYDROCHLORIDE 5 ML: 10 INJECTION, SOLUTION INFILTRATION; PERINEURAL at 17:10

## 2022-08-24 NOTE — PROGRESS NOTES
Chief Complaint:   Chief Complaint   Patient presents with    Knee Pain     LEFT KNEE-STILL HAVING PAIN          History of Present Illness:       Patient is a 49 y.o. male returns follow up for the above complaint. The patient was last seen approximately 1 weeksago. The symptoms show no change since the last visit. The patient has had no further testing for the problem. He would like to proceed with repeat aspiration injection as previously discussed under ultrasound guidance    No new injuries no new events    He denies any constitutional symptoms     Past Medical History:        Past Medical History:   Diagnosis Date    Asthma     Mitral regurgitation 2007    PUD (peptic ulcer disease) 1983        Present Medications:         Current Outpatient Medications   Medication Sig Dispense Refill    cyclobenzaprine (FLEXERIL) 10 MG tablet Take 1 tablet by mouth nightly as needed for Muscle spasms 30 tablet 1    Probiotic Product (PROBIOTIC-10 PO) Take by mouth      Multiple Vitamins-Minerals (THERAPEUTIC MULTIVITAMIN-MINERALS) tablet Take 1 tablet by mouth daily. No current facility-administered medications for this visit. Allergies:      No Known Allergies        Review of Systems:    Pertinent items are noted in HPI         Vital Signs: There were no vitals filed for this visit. General Exam:     Constitutional: Patient is adequately groomed with no evidence of malnutrition    Physical Exam: left knee      Primary Exam:    Inspection: No deformity atrophy appreciable fullness in the popliteal space      Palpation: No focal tenderness in the popliteal space      Range of Motion: Stable unchanged from previous      Skin: There are no rashes, ulcerations or lesions.       Gait: Nonantalgic insert Neuro3     Neurovascular - non focal and intact       Additional Comments:        Additional Examinations:                Office Imaging Results/Procedures PerformedToday:          Office Procedures:     Orders Placed This Encounter   Procedures    US GUIDED NEEDLE PLACEMENT     Standing Status:   Future     Number of Occurrences:   1     Standing Expiration Date:   8/24/2023     Order Specific Question:   Reason for exam:     Answer:   cyst asp    UT ARTHROCENTESIS ASPIR&/INJ MAJOR JT/BURSA W/O US     Ultrasound-guided aspiration of the proximally positioned left knee popliteal synovial cyst  Logiq E ultrasound 8 MHz    Patient position prone on examination table curvilinear transducer was utilized in the proximal popliteal space was evaluated extensively using curvilinear transducer. Again noted was cystic structure positioned between the semimembranosus muscle and proximal origin of the medial gastrocnemius consistent with semimembranosus gastrocnemius bursal cyst previously characterized. This cyst measured 2.46 cm proximal to distal X 1.73 cm posterior to anterior centimeters     The linear transducer was placed in short axis over the cyst at its greatest diameter. Sterile prep was performed and from a lateral approach 25-gauge needle was advanced subcutaneously and intramuscularly directing the needle toward the superficial lateral aspect of the cyst.  The popliteal artery was positioned just lateral to the cyst and its position was noted during the entirety of the procedure. Approximate 5 cc of 1% lidocaine was injected. A 20-gauge spinal needle was then advanced in the single tract and the needle tip advanced within the central aspect of cyst.  Additional 1 to 2 cc of 0.25% Marcaine was utilized in advancing the needle through the soft tissue to the target location. Approximately 3 cc of yellow viscous aspirate was obtained and the cyst was visualized to decompress. Using exchange of syringe technique 1 cc of Celestone and 2 cc of 0.25% Marcaine was injected. The cyst was visualized to hydrodissect with injectate. Needle withdrawn Band-Aid applied to puncture wound. Images stored      Other Outside Imaging and Testing Personally Reviewed:    US GUIDED NEEDLE PLACEMENT    Result Date: 8/24/2022  Radiology result is complete; follow up with provider / physician office for radiology results              Assessment   Impression: . Encounter Diagnoses   Name Primary? Posterior left knee pain Yes    Popliteal synovial cyst, left               Plan: Activity modification postinjection protocol  Ace wrap compression for the remainder of the day  Courtesy call in 1 to 2 weeks to assess his status         Orders:        Orders Placed This Encounter   Procedures    US GUIDED NEEDLE PLACEMENT     Standing Status:   Future     Number of Occurrences:   1     Standing Expiration Date:   8/24/2023     Order Specific Question:   Reason for exam:     Answer:   cyst asp    [place] ARTHROCENTESIS ASPIR&/INJ MAJOR JT/BURSA W/O US         Danitza Mcclellan MD.      Disclaimer: \"This note was dictated with voice recognition software. Though review and correction are routine, we apologize for any errors. \"

## 2022-11-28 ENCOUNTER — OFFICE VISIT (OUTPATIENT)
Dept: FAMILY MEDICINE CLINIC | Age: 52
End: 2022-11-28
Payer: COMMERCIAL

## 2022-11-28 VITALS
HEART RATE: 85 BPM | DIASTOLIC BLOOD PRESSURE: 78 MMHG | OXYGEN SATURATION: 98 % | SYSTOLIC BLOOD PRESSURE: 120 MMHG | HEIGHT: 70 IN | BODY MASS INDEX: 31.5 KG/M2 | WEIGHT: 220 LBS

## 2022-11-28 DIAGNOSIS — Z01.818 PREOP EXAMINATION: Primary | ICD-10-CM

## 2022-11-28 DIAGNOSIS — I34.0 MITRAL VALVE INSUFFICIENCY, UNSPECIFIED ETIOLOGY: ICD-10-CM

## 2022-11-28 DIAGNOSIS — J45.20 MILD INTERMITTENT ASTHMA WITHOUT COMPLICATION: ICD-10-CM

## 2022-11-28 DIAGNOSIS — Z00.00 HEALTHCARE MAINTENANCE: ICD-10-CM

## 2022-11-28 PROCEDURE — G8417 CALC BMI ABV UP PARAM F/U: HCPCS | Performed by: FAMILY MEDICINE

## 2022-11-28 PROCEDURE — 93000 ELECTROCARDIOGRAM COMPLETE: CPT | Performed by: FAMILY MEDICINE

## 2022-11-28 PROCEDURE — G8427 DOCREV CUR MEDS BY ELIG CLIN: HCPCS | Performed by: FAMILY MEDICINE

## 2022-11-28 PROCEDURE — 99213 OFFICE O/P EST LOW 20 MIN: CPT | Performed by: FAMILY MEDICINE

## 2022-11-28 PROCEDURE — G8484 FLU IMMUNIZE NO ADMIN: HCPCS | Performed by: FAMILY MEDICINE

## 2022-11-28 ASSESSMENT — PATIENT HEALTH QUESTIONNAIRE - PHQ9
SUM OF ALL RESPONSES TO PHQ QUESTIONS 1-9: 0
2. FEELING DOWN, DEPRESSED OR HOPELESS: 0
SUM OF ALL RESPONSES TO PHQ QUESTIONS 1-9: 0

## 2022-11-28 NOTE — PROGRESS NOTES
[address] [facility] Preoperative Evaluation       Mary Mcginnis MD                                       [address] [facility] [address] [facility], [address] [place] [address] Phone     [phone] Fax    Dear Dr. Nicole Shipman,     Thank you for referring Tim Davalos to me for Preoperative Evaluation. Below are the relevant portions of my assessment and plan of care. Tim Davalos    47 y.o.   1970    [address] [facility] [address]    Vitals:    11/28/22 1428   BP: 120/78   Site: Left Upper Arm   Position: Sitting   Cuff Size: Medium Adult   Pulse: 85   SpO2: 98%   Weight: 220 lb (99.8 kg)   Height: 5' 10\" (1.778 m)      Wt Readings from Last 2 Encounters:   11/28/22 220 lb (99.8 kg)   08/24/22 212 lb (96.2 kg)     BP Readings from Last 3 Encounters:   11/28/22 120/78   12/22/21 122/80   12/06/21 122/74        No Known Allergies  Outpatient Medications Marked as Taking for the 11/28/22 encounter (Office Visit) with Rickie Zarate MD   Medication Sig Dispense Refill    Probiotic Product (PROBIOTIC-10 PO) Take by mouth      Multiple Vitamins-Minerals (THERAPEUTIC MULTIVITAMIN-MINERALS) tablet Take 1 tablet by mouth daily. He presents to the office today for a preoperative consultation at the request of their surgeon, Dr. Nicole Shipman who plans on performing left knee arthroscopy on December 13. The procedure will take place at [facility]. Planned anesthesia is General.  The patient has the following known anesthesia issues:  none .     Patient Active Problem List   Diagnosis    Mitral regurgitation    Asthma     Past Medical History:   Diagnosis Date    Asthma     Mitral regurgitation 2007    PUD (peptic ulcer disease) 1983     Past Surgical History:   Procedure Laterality Date    CARDIAC CATHETERIZATION  2003    normal    COLONOSCOPY  2006    normal    KNEE SURGERY Left 2016    UPPER GASTROINTESTINAL ENDOSCOPY  age 16 Social History     Socioeconomic History    Marital status:      Spouse name: Not on file    Number of children: 0    Years of education: Not on file    Highest education level: Not on file   Occupational History    Occupation:    Tobacco Use    Smoking status: Never    Smokeless tobacco: Never   Substance and Sexual Activity    Alcohol use: No    Drug use: No    Sexual activity: Yes     Partners: Female   Other Topics Concern    Not on file   Social History Narrative    Not on file     Social Determinants of Health     Financial Resource Strain: Low Risk     Difficulty of Paying Living Expenses: Not hard at all   Food Insecurity: No Food Insecurity    Worried About Running Out of Food in the Last Year: Never true    Ran Out of Food in the Last Year: Never true   Transportation Needs: Not on file   Physical Activity: Not on file   Stress: Not on file   Social Connections: Not on file   Intimate Partner Violence: Not on file   Housing Stability: Not on file     Family History   Problem Relation Age of Onset    Alcohol Abuse Mother     Diabetes Mother     Heart Failure Mother 52    High Cholesterol Father     Diabetes Maternal Grandmother     Cancer Paternal Grandfather 79        unknown         Review of Systems:  Constitutional: negative for fevers  Ears, nose, mouth, throat, and face: negative for nasal congestion and sore throat  Respiratory: negative for cough and shortness of breath  Cardiovascular: negative for chest pain and palpitations  Gastrointestinal: negative for abdominal pain and change in bowel habits       Physical Exam   /78 (Site: Left Upper Arm, Position: Sitting, Cuff Size: Medium Adult)   Pulse 85   Ht 5' 10\" (1.778 m)   Wt 220 lb (99.8 kg)   SpO2 98%   BMI 31.57 kg/m²   Constitutional: Patient is oriented to person, place, and time. He appears well-developed and well-nourished. No distress. Head: Normocephalic and atraumatic.    Right Ear: Tympanic membrane, external ear and ear canal normal.   Left Ear: Tympanic membrane, external ear and ear canal normal.   Nose: Nose normal.   Mouth/Throat: Oropharynx is clear and moist, and mucous membranes are normal.  There is no cervical adenopathy. There are no loose teeth. Eyes: Conjunctivae and extraocular motions are normal. Pupils are equal, round, and reactive to light bilaterally. Neck: Neck supple. No JVD present. No mass and no thyromegaly present. Cardiovascular: Normal rate, regular rhythm, normal heart sounds and intact distal pulses. Exam reveals no gallop and no friction rub. No murmur heard. No carotid bruits. Pulmonary/Chest: Effort normal and breath sounds normal. No respiratory distress. There are no wheezes, rhonchi or rales. Abdominal: Soft, non-tender. Normal bowel sounds and aorta. There is no organomegaly, palpable mass. Neurological: He is alert and oriented to person, place, and time. No cranial nerve deficit. Coordination normal.   Skin: Skin is warm and dry. There is no rash or erythema. No suspicious lesions noted. Psychiatric: He has a normal mood and affect. Speech and behavior are normal. Judgment, cognition and memory are normal.     EKG: normal sinus rhythm, unchanged from previous tracings. Assessment/Plan:    1. Preop examination  - CBC with Auto Differential; Future  - Comprehensive Metabolic Panel; Future  - EKG 12 lead    2. Mitral valve insufficiency, unspecified etiology  stable  - EKG 12 lead    3. Mild intermittent asthma without complication  stable    4. Healthcare maintenance  - CBC with Auto Differential; Future  - Hemoglobin A1C; Future  - Comprehensive Metabolic Panel; Future  - Lipid Panel; Future  - PSA, Prostatic Specific Antigen; Future       47 y.o. patient with planned surgery as above. 1. Preoperative workup as follows: labs as ordered. 2. Change in medication regimen before surgery: N/A, not on any meds.   3. Prophylaxis for cardiac events with perioperative beta-blockers: Not indicated  ACC/AHA indications for pre-operative beta-blocker use:    Vascular surgery with history of postitive stress test  Intermediate or high risk surgery with history of CAD   Intermediate or high risk surgery with multiple clinical predictors of CAD- 2 of the following: history of compensated or prior heart failure, history of cerebrovascular disease, DM, or renal insufficiency  Routine administration of higher-dose, long-acting metoprolol in beta-blocker-naïve patients on the day of surgery, and in the absence of dose titration is associated with an overall increase in mortality. Beta-blockers should be started days to weeks prior to surgery and titrated to pulse < 70.  4. Deep vein thrombosis prophylaxis: regimen to be chosen by surgical team  5. Pre-Operative Risk assessment using 2014 ACC/AHA guidelines     Emergent procedure No  Active Cardiac Condition No (decompensated HF, Arrhythmia, MI <3 weeks, severe valve disease)  Risk Level of Procedure Intermediate Risk (intraperitoneal, intrathoracic, HENT, orthopedic, or carotid endarterectomy, etc.)  Revised Cardiac Risk Index Risk factors: None  Measurement of Exercise Tolerance before Surgery >4 METs Yes functional capacity is classified as excellent (>10 METs), good (7 METs to 10 METs), moderate (4 METs to 6 METs), poor (<4 METs). Examples of activities associated with >4 METs are climbing a flight of stairs or walking up a hill, walking on level ground at 4 mph, and performing heavy work around the house. According to the 2014 ACC/AHA pre-operative risk assessment guidelines this patient is a low risk for major cardiac complications during a intermediate risk procedure and may continue as planned provided labs are acceptable. If you have questions, please do not hesitate to call me. Sincerely,        Mary White MD

## 2022-12-02 DIAGNOSIS — Z01.818 PREOP EXAMINATION: ICD-10-CM

## 2022-12-02 DIAGNOSIS — Z00.00 HEALTHCARE MAINTENANCE: ICD-10-CM

## 2022-12-02 LAB
A/G RATIO: 2 (ref 1.1–2.2)
ALBUMIN SERPL-MCNC: 4.7 G/DL (ref 3.4–5)
ALP BLD-CCNC: 44 U/L (ref 40–129)
ALT SERPL-CCNC: 29 U/L (ref 10–40)
ANION GAP SERPL CALCULATED.3IONS-SCNC: 9 MMOL/L (ref 3–16)
AST SERPL-CCNC: 23 U/L (ref 15–37)
BASOPHILS ABSOLUTE: 0 K/UL (ref 0–0.2)
BASOPHILS RELATIVE PERCENT: 1.1 %
BILIRUB SERPL-MCNC: 0.8 MG/DL (ref 0–1)
BUN BLDV-MCNC: 19 MG/DL (ref 7–20)
CALCIUM SERPL-MCNC: 9.5 MG/DL (ref 8.3–10.6)
CHLORIDE BLD-SCNC: 103 MMOL/L (ref 99–110)
CHOLESTEROL, TOTAL: 164 MG/DL (ref 0–199)
CO2: 24 MMOL/L (ref 21–32)
CREAT SERPL-MCNC: 0.8 MG/DL (ref 0.9–1.3)
EOSINOPHILS ABSOLUTE: 0.1 K/UL (ref 0–0.6)
EOSINOPHILS RELATIVE PERCENT: 2.6 %
GFR SERPL CREATININE-BSD FRML MDRD: >60 ML/MIN/{1.73_M2}
GLUCOSE BLD-MCNC: 108 MG/DL (ref 70–99)
HCT VFR BLD CALC: 43.4 % (ref 40.5–52.5)
HDLC SERPL-MCNC: 49 MG/DL (ref 40–60)
HEMOGLOBIN: 14.5 G/DL (ref 13.5–17.5)
LDL CHOLESTEROL CALCULATED: 95 MG/DL
LYMPHOCYTES ABSOLUTE: 1.2 K/UL (ref 1–5.1)
LYMPHOCYTES RELATIVE PERCENT: 30.7 %
MCH RBC QN AUTO: 30.2 PG (ref 26–34)
MCHC RBC AUTO-ENTMCNC: 33.4 G/DL (ref 31–36)
MCV RBC AUTO: 90.4 FL (ref 80–100)
MONOCYTES ABSOLUTE: 0.3 K/UL (ref 0–1.3)
MONOCYTES RELATIVE PERCENT: 8.2 %
NEUTROPHILS ABSOLUTE: 2.2 K/UL (ref 1.7–7.7)
NEUTROPHILS RELATIVE PERCENT: 57.4 %
PDW BLD-RTO: 13.6 % (ref 12.4–15.4)
PLATELET # BLD: 208 K/UL (ref 135–450)
PMV BLD AUTO: 8.5 FL (ref 5–10.5)
POTASSIUM SERPL-SCNC: 4.5 MMOL/L (ref 3.5–5.1)
PROSTATE SPECIFIC ANTIGEN: 1.82 NG/ML (ref 0–4)
RBC # BLD: 4.8 M/UL (ref 4.2–5.9)
SODIUM BLD-SCNC: 136 MMOL/L (ref 136–145)
TOTAL PROTEIN: 7.1 G/DL (ref 6.4–8.2)
TRIGL SERPL-MCNC: 99 MG/DL (ref 0–150)
VLDLC SERPL CALC-MCNC: 20 MG/DL
WBC # BLD: 3.8 K/UL (ref 4–11)

## 2022-12-03 LAB
ESTIMATED AVERAGE GLUCOSE: 125.5 MG/DL
HBA1C MFR BLD: 6 %

## 2022-12-21 ENCOUNTER — HOSPITAL ENCOUNTER (OUTPATIENT)
Dept: VASCULAR LAB | Age: 52
Discharge: HOME | End: 2022-12-21
Payer: COMMERCIAL

## 2022-12-21 DIAGNOSIS — M25.462 SWELLING OF LEFT KNEE JOINT: Primary | ICD-10-CM

## 2022-12-21 PROCEDURE — 93971 EXTREMITY STUDY: CPT

## 2023-07-06 SDOH — ECONOMIC STABILITY: INCOME INSECURITY: HOW HARD IS IT FOR YOU TO PAY FOR THE VERY BASICS LIKE FOOD, HOUSING, MEDICAL CARE, AND HEATING?: NOT VERY HARD

## 2023-07-06 SDOH — ECONOMIC STABILITY: FOOD INSECURITY: WITHIN THE PAST 12 MONTHS, THE FOOD YOU BOUGHT JUST DIDN'T LAST AND YOU DIDN'T HAVE MONEY TO GET MORE.: NEVER TRUE

## 2023-07-06 SDOH — ECONOMIC STABILITY: TRANSPORTATION INSECURITY
IN THE PAST 12 MONTHS, HAS LACK OF TRANSPORTATION KEPT YOU FROM MEETINGS, WORK, OR FROM GETTING THINGS NEEDED FOR DAILY LIVING?: NO

## 2023-07-06 SDOH — ECONOMIC STABILITY: FOOD INSECURITY: WITHIN THE PAST 12 MONTHS, YOU WORRIED THAT YOUR FOOD WOULD RUN OUT BEFORE YOU GOT MONEY TO BUY MORE.: NEVER TRUE

## 2023-07-06 SDOH — ECONOMIC STABILITY: HOUSING INSECURITY
IN THE LAST 12 MONTHS, WAS THERE A TIME WHEN YOU DID NOT HAVE A STEADY PLACE TO SLEEP OR SLEPT IN A SHELTER (INCLUDING NOW)?: NO

## 2023-07-07 ENCOUNTER — TELEMEDICINE (OUTPATIENT)
Dept: FAMILY MEDICINE CLINIC | Age: 53
End: 2023-07-07
Payer: COMMERCIAL

## 2023-07-07 DIAGNOSIS — Z00.00 HEALTHCARE MAINTENANCE: ICD-10-CM

## 2023-07-07 DIAGNOSIS — M71.22 BAKER CYST, LEFT: ICD-10-CM

## 2023-07-07 DIAGNOSIS — M79.609 POPLITEAL PAIN: Primary | ICD-10-CM

## 2023-07-07 PROCEDURE — 99213 OFFICE O/P EST LOW 20 MIN: CPT | Performed by: FAMILY MEDICINE

## 2023-07-07 PROCEDURE — G8427 DOCREV CUR MEDS BY ELIG CLIN: HCPCS | Performed by: FAMILY MEDICINE

## 2023-07-07 PROCEDURE — 3017F COLORECTAL CA SCREEN DOC REV: CPT | Performed by: FAMILY MEDICINE

## 2023-07-07 ASSESSMENT — PATIENT HEALTH QUESTIONNAIRE - PHQ9
SUM OF ALL RESPONSES TO PHQ QUESTIONS 1-9: 0
SUM OF ALL RESPONSES TO PHQ QUESTIONS 1-9: 0
2. FEELING DOWN, DEPRESSED OR HOPELESS: 0
SUM OF ALL RESPONSES TO PHQ QUESTIONS 1-9: 0
SUM OF ALL RESPONSES TO PHQ9 QUESTIONS 1 & 2: 0
1. LITTLE INTEREST OR PLEASURE IN DOING THINGS: 0
SUM OF ALL RESPONSES TO PHQ QUESTIONS 1-9: 0

## 2023-07-07 NOTE — PROGRESS NOTES
2023    TELEHEALTH EVALUATION -- Audio/Visual (During [facility] public health emergency)    HPI:    Johanna Vazquez (:  1970) has requested an audio/video evaluation for the following concern(s):    2 years ago began having left posterior knee pain. Saw ortho, had baker cyst drained twice. Had relief of pain for about 2 months the first time and only 2 weeks the second time. Then had knee scoped again last fall with [facility] ortho which did not help. Even after surgery still had leg pain. Had US done to rule out DVT which was negative but did show bakers cyst again. Has still been hurting for past 6 months. Restarted PT to stretch calf muscle but has not helped. Pain is under left posterior knee between knee and calf. Pain worse an hour after falls sleep or after standing for long periods of time or stretches calf muscle. Foot position does affect the knee pain. Also requesting derm referral.     Review of Systems:  Gen:  Denies fever, chills, headaches. No weight loss  HEENT:  Denies cold symptoms, sore throat. CV:  Denies chest pain or tightness, palpitations. Pulm:  Denies shortness of breath, cough. Abd:  Denies abdominal pain, change in bowel habits. Prior to Visit Medications    Medication Sig Taking?  Authorizing Provider   Probiotic Product (PROBIOTIC-10 PO) Take by mouth Yes Historical Provider, MD   Multiple Vitamins-Minerals (THERAPEUTIC MULTIVITAMIN-MINERALS) tablet Take 1 tablet by mouth daily Yes Historical Provider, MD       Past Medical History:   Diagnosis Date    Asthma     Mitral regurgitation     PUD (peptic ulcer disease)        Past Surgical History:   Procedure Laterality Date    CARDIAC CATHETERIZATION      normal    COLONOSCOPY  2006    normal    KNEE SURGERY Left 2016    UPPER GASTROINTESTINAL ENDOSCOPY  age 16       Family History   Problem Relation Age of Onset    Alcohol Abuse Mother     Diabetes Mother     Heart Failure Mother 52    High Cholesterol

## 2023-07-10 SDOH — HEALTH STABILITY: PHYSICAL HEALTH: ON AVERAGE, HOW MANY MINUTES DO YOU ENGAGE IN EXERCISE AT THIS LEVEL?: 20 MIN

## 2023-07-10 SDOH — HEALTH STABILITY: PHYSICAL HEALTH: ON AVERAGE, HOW MANY DAYS PER WEEK DO YOU ENGAGE IN MODERATE TO STRENUOUS EXERCISE (LIKE A BRISK WALK)?: 4 DAYS

## 2023-07-11 ENCOUNTER — OFFICE VISIT (OUTPATIENT)
Dept: ORTHOPEDIC SURGERY | Age: 53
End: 2023-07-11

## 2023-07-11 VITALS — WEIGHT: 220 LBS | HEIGHT: 70 IN | BODY MASS INDEX: 31.5 KG/M2

## 2023-07-11 DIAGNOSIS — F40.240 CLAUSTROPHOBIA: ICD-10-CM

## 2023-07-11 DIAGNOSIS — M25.562 LEFT KNEE PAIN, UNSPECIFIED CHRONICITY: Primary | ICD-10-CM

## 2023-07-11 RX ORDER — DIAZEPAM 5 MG/1
TABLET ORAL
Qty: 1 TABLET | Refills: 0 | Status: SHIPPED | OUTPATIENT
Start: 2023-07-11 | End: 2023-07-18

## 2023-07-13 NOTE — PROGRESS NOTES
7/11/2023     Reason for visit:  Left knee pain    History of Present Illness: The patient is a 50-year-old male who presents for evaluation of his left knee. He reports knee pain for several years. He has undergone 2 previous knee arthroscopies which sounds like partial medial meniscectomies. Most recent was back in December 2022. He does report that really did not help him. Majority his pain is actually posterior. Is made worse with standing, walking, stairs. Occasional catching and locking. Occasional swelling.     Medical History:  Past Medical History:   Diagnosis Date    Asthma     Mitral regurgitation 2007    PUD (peptic ulcer disease) 1983      Past Surgical History:   Procedure Laterality Date    CARDIAC CATHETERIZATION  2003    normal    COLONOSCOPY  2006    normal    KNEE SURGERY Left 2016    UPPER GASTROINTESTINAL ENDOSCOPY  age 16      Family History   Problem Relation Age of Onset    Alcohol Abuse Mother     Diabetes Mother     Heart Failure Mother 52    High Cholesterol Father     Atrial Fibrillation Brother     Colon Cancer Brother 47    Diabetes Maternal Grandmother     Colon Cancer Maternal Grandfather 61      Social History     Socioeconomic History    Marital status:      Spouse name: Not on file    Number of children: 0    Years of education: Not on file    Highest education level: Not on file   Occupational History    Occupation:    Tobacco Use    Smoking status: Never    Smokeless tobacco: Never   Substance and Sexual Activity    Alcohol use: No    Drug use: No    Sexual activity: Yes     Partners: Female   Other Topics Concern    Not on file   Social History Narrative    Not on file     Social Determinants of Health     Financial Resource Strain: Not on file   Food Insecurity: Not on file   Transportation Needs: Not on file   Physical Activity: Insufficiently Active    Days of Exercise per Week: 4 days    Minutes of Exercise per Session: 20 min   Stress: Not on

## 2023-07-21 ENCOUNTER — TELEPHONE (OUTPATIENT)
Dept: ORTHOPEDIC SURGERY | Age: 53
End: 2023-07-21

## 2023-07-27 ENCOUNTER — OFFICE VISIT (OUTPATIENT)
Dept: ORTHOPEDIC SURGERY | Age: 53
End: 2023-07-27

## 2023-07-27 VITALS — BODY MASS INDEX: 31.5 KG/M2 | WEIGHT: 220 LBS | HEIGHT: 70 IN

## 2023-07-27 DIAGNOSIS — M25.562 LEFT KNEE PAIN, UNSPECIFIED CHRONICITY: Primary | ICD-10-CM

## 2023-07-27 RX ORDER — BUPIVACAINE HYDROCHLORIDE 5 MG/ML
4 INJECTION, SOLUTION PERINEURAL ONCE
Status: COMPLETED | OUTPATIENT
Start: 2023-07-27 | End: 2023-07-27

## 2023-07-27 RX ORDER — METHYLPREDNISOLONE ACETATE 40 MG/ML
40 INJECTION, SUSPENSION INTRA-ARTICULAR; INTRALESIONAL; INTRAMUSCULAR; SOFT TISSUE ONCE
Status: COMPLETED | OUTPATIENT
Start: 2023-07-27 | End: 2023-07-27

## 2023-07-27 RX ADMIN — METHYLPREDNISOLONE ACETATE 40 MG: 40 INJECTION, SUSPENSION INTRA-ARTICULAR; INTRALESIONAL; INTRAMUSCULAR; SOFT TISSUE at 09:13

## 2023-07-27 RX ADMIN — BUPIVACAINE HYDROCHLORIDE 20 MG: 5 INJECTION, SOLUTION PERINEURAL at 09:12

## 2023-08-21 ENCOUNTER — OFFICE VISIT (OUTPATIENT)
Dept: FAMILY MEDICINE CLINIC | Age: 53
End: 2023-08-21
Payer: COMMERCIAL

## 2023-08-21 VITALS
HEART RATE: 72 BPM | WEIGHT: 223 LBS | SYSTOLIC BLOOD PRESSURE: 114 MMHG | BODY MASS INDEX: 31.92 KG/M2 | DIASTOLIC BLOOD PRESSURE: 70 MMHG | HEIGHT: 70 IN

## 2023-08-21 DIAGNOSIS — M71.22 BAKER CYST, LEFT: Primary | ICD-10-CM

## 2023-08-21 PROCEDURE — 1036F TOBACCO NON-USER: CPT | Performed by: FAMILY MEDICINE

## 2023-08-21 PROCEDURE — G8427 DOCREV CUR MEDS BY ELIG CLIN: HCPCS | Performed by: FAMILY MEDICINE

## 2023-08-21 PROCEDURE — 99213 OFFICE O/P EST LOW 20 MIN: CPT | Performed by: FAMILY MEDICINE

## 2023-08-21 PROCEDURE — 3017F COLORECTAL CA SCREEN DOC REV: CPT | Performed by: FAMILY MEDICINE

## 2023-08-21 PROCEDURE — G8417 CALC BMI ABV UP PARAM F/U: HCPCS | Performed by: FAMILY MEDICINE

## 2023-08-21 RX ORDER — MULTIVIT-MIN/IRON/FOLIC ACID/K 18-600-40
CAPSULE ORAL
COMMUNITY

## 2023-10-23 ENCOUNTER — OFFICE VISIT (OUTPATIENT)
Dept: FAMILY MEDICINE CLINIC | Age: 53
End: 2023-10-23
Payer: COMMERCIAL

## 2023-10-23 VITALS
HEART RATE: 70 BPM | WEIGHT: 226 LBS | SYSTOLIC BLOOD PRESSURE: 118 MMHG | OXYGEN SATURATION: 97 % | DIASTOLIC BLOOD PRESSURE: 78 MMHG | HEIGHT: 70 IN | BODY MASS INDEX: 32.35 KG/M2

## 2023-10-23 DIAGNOSIS — M79.662 BILATERAL CALF PAIN: Primary | ICD-10-CM

## 2023-10-23 DIAGNOSIS — M79.661 BILATERAL CALF PAIN: Primary | ICD-10-CM

## 2023-10-23 PROCEDURE — 99214 OFFICE O/P EST MOD 30 MIN: CPT | Performed by: FAMILY MEDICINE

## 2023-10-23 PROCEDURE — G8417 CALC BMI ABV UP PARAM F/U: HCPCS | Performed by: FAMILY MEDICINE

## 2023-10-23 PROCEDURE — 1036F TOBACCO NON-USER: CPT | Performed by: FAMILY MEDICINE

## 2023-10-23 PROCEDURE — G8427 DOCREV CUR MEDS BY ELIG CLIN: HCPCS | Performed by: FAMILY MEDICINE

## 2023-10-23 PROCEDURE — 3017F COLORECTAL CA SCREEN DOC REV: CPT | Performed by: FAMILY MEDICINE

## 2023-10-23 PROCEDURE — G8484 FLU IMMUNIZE NO ADMIN: HCPCS | Performed by: FAMILY MEDICINE

## 2023-10-23 RX ORDER — ROPINIROLE 1 MG/1
1 TABLET, FILM COATED ORAL NIGHTLY
Qty: 30 TABLET | Refills: 3 | Status: SHIPPED | OUTPATIENT
Start: 2023-10-23

## 2024-01-09 DIAGNOSIS — M79.661 BILATERAL CALF PAIN: ICD-10-CM

## 2024-01-09 DIAGNOSIS — M79.662 BILATERAL CALF PAIN: ICD-10-CM

## 2024-01-10 RX ORDER — ROPINIROLE 1 MG/1
1 TABLET, FILM COATED ORAL NIGHTLY
Qty: 30 TABLET | Refills: 3 | Status: SHIPPED | OUTPATIENT
Start: 2024-01-10

## 2024-01-10 NOTE — TELEPHONE ENCOUNTER
Medication:   Requested Prescriptions     Pending Prescriptions Disp Refills    rOPINIRole (REQUIP) 1 MG tablet 30 tablet 3     Sig: Take 1 tablet by mouth nightly        Last Filled:      Patient Phone Number: [phone] (home) [phone] (work)    Last appt: 10/23/2023   Next appt: Visit date not found    Last OARRS:        No data to display

## 2024-05-28 DIAGNOSIS — M79.662 BILATERAL CALF PAIN: ICD-10-CM

## 2024-05-28 DIAGNOSIS — M79.661 BILATERAL CALF PAIN: ICD-10-CM

## 2024-05-28 RX ORDER — ROPINIROLE 1 MG/1
1 TABLET, FILM COATED ORAL NIGHTLY
Qty: 30 TABLET | Refills: 3 | Status: SHIPPED | OUTPATIENT
Start: 2024-05-28

## 2024-05-28 NOTE — TELEPHONE ENCOUNTER
Medication:   Requested Prescriptions     Pending Prescriptions Disp Refills    rOPINIRole (REQUIP) 1 MG tablet 30 tablet 3     Sig: Take 1 tablet by mouth nightly        Last Filled:  01/10/2024 #30 3rf     Patient Phone Number: [phone] (home) [phone] (work)    Last appt: 10/23/2023   Next appt: Visit date not found    Last OARRS:        No data to display

## 2024-08-30 DIAGNOSIS — M79.662 BILATERAL CALF PAIN: ICD-10-CM

## 2024-08-30 DIAGNOSIS — M79.661 BILATERAL CALF PAIN: ICD-10-CM

## 2024-08-30 NOTE — TELEPHONE ENCOUNTER
Medication:   Requested Prescriptions     Pending Prescriptions Disp Refills    rOPINIRole (REQUIP) 1 MG tablet 30 tablet 3     Sig: Take 1 tablet by mouth nightly        Last Filled:  05/28/2024 #30 3rf     Patient Phone Number: [phone] (home) [phone] (work)    Last appt: 10/23/2023   Next appt: Visit date not found    Last OARRS:        No data to display

## 2024-09-03 RX ORDER — ROPINIROLE 1 MG/1
1 TABLET, FILM COATED ORAL NIGHTLY
Qty: 30 TABLET | Refills: 0 | Status: SHIPPED | OUTPATIENT
Start: 2024-09-03

## 2024-09-03 ASSESSMENT — PATIENT HEALTH QUESTIONNAIRE - PHQ9
2. FEELING DOWN, DEPRESSED OR HOPELESS: NOT AT ALL
2. FEELING DOWN, DEPRESSED OR HOPELESS: NOT AT ALL
1. LITTLE INTEREST OR PLEASURE IN DOING THINGS: NOT AT ALL
SUM OF ALL RESPONSES TO PHQ9 QUESTIONS 1 & 2: 0
1. LITTLE INTEREST OR PLEASURE IN DOING THINGS: NOT AT ALL
SUM OF ALL RESPONSES TO PHQ QUESTIONS 1-9: 0
SUM OF ALL RESPONSES TO PHQ9 QUESTIONS 1 & 2: 0

## 2024-09-03 NOTE — TELEPHONE ENCOUNTER
Patient scheduled.    Recent Visits  Date Type Provider Dept   10/23/23 Office Visit Mary Garcia MD [facility]   08/21/23 Office Visit Mary Garcia MD [facility]   Showing recent visits within past 540 days with a meds authorizing provider and meeting all other requirements  Future Appointments  Date Type Provider Dept   09/09/24 Appointment Mary Garcia MD [facility]   Showing future appointments within next 150 days with a meds authorizing provider and meeting all other requirements

## 2024-09-06 SDOH — ECONOMIC STABILITY: FOOD INSECURITY: WITHIN THE PAST 12 MONTHS, THE FOOD YOU BOUGHT JUST DIDN'T LAST AND YOU DIDN'T HAVE MONEY TO GET MORE.: NEVER TRUE

## 2024-09-06 SDOH — ECONOMIC STABILITY: FOOD INSECURITY: WITHIN THE PAST 12 MONTHS, YOU WORRIED THAT YOUR FOOD WOULD RUN OUT BEFORE YOU GOT MONEY TO BUY MORE.: NEVER TRUE

## 2024-09-06 SDOH — ECONOMIC STABILITY: INCOME INSECURITY: HOW HARD IS IT FOR YOU TO PAY FOR THE VERY BASICS LIKE FOOD, HOUSING, MEDICAL CARE, AND HEATING?: NOT HARD AT ALL

## 2024-09-09 ENCOUNTER — OFFICE VISIT (OUTPATIENT)
Dept: FAMILY MEDICINE CLINIC | Age: 54
End: 2024-09-09
Payer: COMMERCIAL

## 2024-09-09 VITALS
DIASTOLIC BLOOD PRESSURE: 70 MMHG | SYSTOLIC BLOOD PRESSURE: 116 MMHG | HEIGHT: 70 IN | OXYGEN SATURATION: 97 % | HEART RATE: 69 BPM | BODY MASS INDEX: 34.22 KG/M2 | WEIGHT: 239 LBS

## 2024-09-09 DIAGNOSIS — G25.81 RLS (RESTLESS LEGS SYNDROME): ICD-10-CM

## 2024-09-09 DIAGNOSIS — R73.03 PREDIABETES: ICD-10-CM

## 2024-09-09 DIAGNOSIS — Z00.00 ANNUAL PHYSICAL EXAM: Primary | ICD-10-CM

## 2024-09-09 DIAGNOSIS — J45.20 MILD INTERMITTENT ASTHMA WITHOUT COMPLICATION: ICD-10-CM

## 2024-09-09 DIAGNOSIS — M79.661 BILATERAL CALF PAIN: ICD-10-CM

## 2024-09-09 DIAGNOSIS — M79.662 BILATERAL CALF PAIN: ICD-10-CM

## 2024-09-09 PROCEDURE — 90471 IMMUNIZATION ADMIN: CPT | Performed by: FAMILY MEDICINE

## 2024-09-09 PROCEDURE — 99396 PREV VISIT EST AGE 40-64: CPT | Performed by: FAMILY MEDICINE

## 2024-09-09 PROCEDURE — 90677 PCV20 VACCINE IM: CPT | Performed by: FAMILY MEDICINE

## 2024-09-09 RX ORDER — METFORMIN HCL 500 MG
TABLET, EXTENDED RELEASE 24 HR ORAL
COMMUNITY
Start: 2024-07-26

## 2024-09-13 DIAGNOSIS — Z00.00 ANNUAL PHYSICAL EXAM: ICD-10-CM

## 2024-09-13 DIAGNOSIS — R73.03 PREDIABETES: ICD-10-CM

## 2024-09-13 LAB
ALBUMIN SERPL-MCNC: 4.6 G/DL (ref 3.4–5)
ALBUMIN/GLOB SERPL: 1.9 {RATIO} (ref 1.1–2.2)
ALP SERPL-CCNC: 47 U/L (ref 40–129)
ALT SERPL-CCNC: 41 U/L (ref 10–40)
ANION GAP SERPL CALCULATED.3IONS-SCNC: 11 MMOL/L (ref 3–16)
AST SERPL-CCNC: 25 U/L (ref 15–37)
BASOPHILS # BLD: 0 K/UL (ref 0–0.2)
BASOPHILS NFR BLD: 0.7 %
BILIRUB SERPL-MCNC: 0.7 MG/DL (ref 0–1)
BUN SERPL-MCNC: 19 MG/DL (ref 7–20)
CALCIUM SERPL-MCNC: 10 MG/DL (ref 8.3–10.6)
CHLORIDE SERPL-SCNC: 104 MMOL/L (ref 99–110)
CHOLEST SERPL-MCNC: 145 MG/DL (ref 0–199)
CO2 SERPL-SCNC: 25 MMOL/L (ref 21–32)
CREAT SERPL-MCNC: 1 MG/DL (ref 0.9–1.3)
DEPRECATED RDW RBC AUTO: 13.4 % (ref 12.4–15.4)
EOSINOPHIL # BLD: 0.2 K/UL (ref 0–0.6)
EOSINOPHIL NFR BLD: 3.8 %
GFR SERPLBLD CREATININE-BSD FMLA CKD-EPI: 90 ML/MIN/{1.73_M2}
GLUCOSE SERPL-MCNC: 105 MG/DL (ref 70–99)
HCT VFR BLD AUTO: 43.8 % (ref 40.5–52.5)
HDLC SERPL-MCNC: 50 MG/DL (ref 40–60)
HGB BLD-MCNC: 15 G/DL (ref 13.5–17.5)
LDLC SERPL CALC-MCNC: 75 MG/DL
LYMPHOCYTES # BLD: 1.4 K/UL (ref 1–5.1)
LYMPHOCYTES NFR BLD: 27.9 %
MCH RBC QN AUTO: 31.2 PG (ref 26–34)
MCHC RBC AUTO-ENTMCNC: 34.2 G/DL (ref 31–36)
MCV RBC AUTO: 91.3 FL (ref 80–100)
MONOCYTES # BLD: 0.4 K/UL (ref 0–1.3)
MONOCYTES NFR BLD: 8.4 %
NEUTROPHILS # BLD: 3 K/UL (ref 1.7–7.7)
NEUTROPHILS NFR BLD: 59.2 %
PLATELET # BLD AUTO: 222 K/UL (ref 135–450)
PMV BLD AUTO: 8.6 FL (ref 5–10.5)
POTASSIUM SERPL-SCNC: 5.1 MMOL/L (ref 3.5–5.1)
PROT SERPL-MCNC: 7 G/DL (ref 6.4–8.2)
PSA SERPL DL<=0.01 NG/ML-MCNC: 2.36 NG/ML (ref 0–4)
RBC # BLD AUTO: 4.8 M/UL (ref 4.2–5.9)
SODIUM SERPL-SCNC: 140 MMOL/L (ref 136–145)
TRIGL SERPL-MCNC: 99 MG/DL (ref 0–150)
VLDLC SERPL CALC-MCNC: 20 MG/DL
WBC # BLD AUTO: 5.1 K/UL (ref 4–11)

## 2024-09-14 LAB
EST. AVERAGE GLUCOSE BLD GHB EST-MCNC: 122.6 MG/DL
HBA1C MFR BLD: 5.9 %

## 2024-10-08 DIAGNOSIS — M79.661 BILATERAL CALF PAIN: ICD-10-CM

## 2024-10-08 DIAGNOSIS — M79.662 BILATERAL CALF PAIN: ICD-10-CM

## 2024-10-09 RX ORDER — ROPINIROLE 1 MG/1
1 TABLET, FILM COATED ORAL NIGHTLY
Qty: 30 TABLET | Refills: 3 | Status: SHIPPED | OUTPATIENT
Start: 2024-10-09

## 2024-10-09 NOTE — TELEPHONE ENCOUNTER
Medication:   Requested Prescriptions     Pending Prescriptions Disp Refills    rOPINIRole (REQUIP) 1 MG tablet 30 tablet 0     Sig: Take 1 tablet by mouth nightly        Last Filled:  09/03/2024 #30 0rf    Patient Phone Number: [phone] (home) [phone] (work)    Last appt: 9/9/2024   Next appt: Visit date not found    Last OARRS:        No data to display

## 2024-10-23 ENCOUNTER — OFFICE VISIT (OUTPATIENT)
Dept: FAMILY MEDICINE CLINIC | Age: 54
End: 2024-10-23
Payer: COMMERCIAL

## 2024-10-23 VITALS
HEIGHT: 70 IN | DIASTOLIC BLOOD PRESSURE: 80 MMHG | BODY MASS INDEX: 33.5 KG/M2 | SYSTOLIC BLOOD PRESSURE: 126 MMHG | WEIGHT: 234 LBS | HEART RATE: 77 BPM | OXYGEN SATURATION: 97 %

## 2024-10-23 DIAGNOSIS — B96.89 ACUTE BACTERIAL SINUSITIS: Primary | ICD-10-CM

## 2024-10-23 DIAGNOSIS — J01.90 ACUTE BACTERIAL SINUSITIS: Primary | ICD-10-CM

## 2024-10-23 PROCEDURE — G8427 DOCREV CUR MEDS BY ELIG CLIN: HCPCS | Performed by: FAMILY MEDICINE

## 2024-10-23 PROCEDURE — 1036F TOBACCO NON-USER: CPT | Performed by: FAMILY MEDICINE

## 2024-10-23 PROCEDURE — G8484 FLU IMMUNIZE NO ADMIN: HCPCS | Performed by: FAMILY MEDICINE

## 2024-10-23 PROCEDURE — 3017F COLORECTAL CA SCREEN DOC REV: CPT | Performed by: FAMILY MEDICINE

## 2024-10-23 PROCEDURE — 99213 OFFICE O/P EST LOW 20 MIN: CPT | Performed by: FAMILY MEDICINE

## 2024-10-23 PROCEDURE — G8417 CALC BMI ABV UP PARAM F/U: HCPCS | Performed by: FAMILY MEDICINE

## 2024-10-23 NOTE — PROGRESS NOTES
Tony Bell is a 54 y.o. male.    HPI:  2 weeks ago began having symptoms of sinus pain/congestion. No fever/chills  Went to [facility] 6 days ago, was given prednisone, sudafed and mucinex with no improvement.   Now has sinus drainage and worsening sinus pain/congestion. Pain kept her up last night. Has had intermittent sore throat as well.     ROS:  Gen:  Denies fever, chills.  HEENT:  Denies   CV:  Denies chest pain or tightness, palpitations.  Pulm:  Denies shortness of breath, cough.  Abd:  Denies abdominal pain, change in bowel habits.    I have reviewed the patient's medical/surgical/family/social in detail and updated the computerized patient record as appropriate.    Current Outpatient Medications   Medication Sig Dispense Refill    rOPINIRole (REQUIP) 1 MG tablet Take 1 tablet by mouth nightly 30 tablet 3    metFORMIN (GLUCOPHAGE-XR) 500 MG extended release tablet       Cholecalciferol (VITAMIN D) 50 MCG (2000 UT) CAPS capsule Take by mouth      Probiotic Product (PROBIOTIC-10 PO) Take by mouth      Multiple Vitamins-Minerals (THERAPEUTIC MULTIVITAMIN-MINERALS) tablet Take 1 tablet by mouth daily       No current facility-administered medications for this visit.       Past Medical History:   Diagnosis Date    Asthma     Mitral regurgitation 01/01/2007    PUD (peptic ulcer disease) 01/01/1983    Restless legs syndrome 2015     Past Surgical History:   Procedure Laterality Date    CARDIAC CATHETERIZATION  2003    normal    COLONOSCOPY  2006    normal    KNEE SURGERY Left 2016    UPPER GASTROINTESTINAL ENDOSCOPY  age 13     Family History   Problem Relation Age of Onset    Alcohol Abuse Mother     Diabetes Mother     Heart Failure Mother 49    High Cholesterol Father     Atrial Fibrillation Brother     Colon Cancer Brother 54    Diabetes Maternal Grandmother     Colon Cancer Maternal Grandfather 60    Colon Cancer Paternal Grandfather      Social History     Socioeconomic History    Marital status:

## 2025-01-27 DIAGNOSIS — M79.661 BILATERAL CALF PAIN: ICD-10-CM

## 2025-01-27 DIAGNOSIS — M79.662 BILATERAL CALF PAIN: ICD-10-CM

## 2025-01-28 RX ORDER — ROPINIROLE 1 MG/1
1 TABLET, FILM COATED ORAL NIGHTLY
Qty: 30 TABLET | Refills: 3 | Status: SHIPPED | OUTPATIENT
Start: 2025-01-28

## 2025-01-28 NOTE — TELEPHONE ENCOUNTER
Medication:   Requested Prescriptions     Pending Prescriptions Disp Refills    rOPINIRole (REQUIP) 1 MG tablet 30 tablet 3     Sig: Take 1 tablet by mouth nightly        Last Filled:  10/09/2024 #30 3rf    Patient Phone Number: [phone] (home) [phone] (work)    Last appt: 10/23/2024   Next appt: Visit date not found    Last OARRS:        No data to display

## 2025-05-26 DIAGNOSIS — M79.661 BILATERAL CALF PAIN: ICD-10-CM

## 2025-05-26 DIAGNOSIS — M79.662 BILATERAL CALF PAIN: ICD-10-CM

## 2025-05-27 RX ORDER — ROPINIROLE 1 MG/1
1 TABLET, FILM COATED ORAL NIGHTLY
Qty: 30 TABLET | Refills: 3 | Status: SHIPPED | OUTPATIENT
Start: 2025-05-27

## 2025-05-27 NOTE — TELEPHONE ENCOUNTER
Medication:   Requested Prescriptions     Pending Prescriptions Disp Refills    rOPINIRole (REQUIP) 1 MG tablet 30 tablet 3     Sig: Take 1 tablet by mouth nightly        Last Filled:  01/28/2025 #30 3rf     Patient Phone Number: [phone] (home) [phone] (work)    Last appt: 10/23/2024   Next appt: Visit date not found    Last OARRS:        No data to display